# Patient Record
Sex: FEMALE | Race: WHITE | Employment: FULL TIME | URBAN - METROPOLITAN AREA
[De-identification: names, ages, dates, MRNs, and addresses within clinical notes are randomized per-mention and may not be internally consistent; named-entity substitution may affect disease eponyms.]

---

## 2021-08-16 ENCOUNTER — LAB (OUTPATIENT)
Dept: LAB | Facility: CLINIC | Age: 31
End: 2021-08-16
Payer: COMMERCIAL

## 2021-08-16 ENCOUNTER — OFFICE VISIT (OUTPATIENT)
Dept: ALLERGY | Facility: CLINIC | Age: 31
End: 2021-08-16
Payer: COMMERCIAL

## 2021-08-16 VITALS
SYSTOLIC BLOOD PRESSURE: 124 MMHG | HEART RATE: 78 BPM | WEIGHT: 249.6 LBS | OXYGEN SATURATION: 99 % | DIASTOLIC BLOOD PRESSURE: 87 MMHG

## 2021-08-16 DIAGNOSIS — H10.10 ALLERGIC RHINOCONJUNCTIVITIS: ICD-10-CM

## 2021-08-16 DIAGNOSIS — H10.10 ALLERGIC RHINOCONJUNCTIVITIS: Primary | ICD-10-CM

## 2021-08-16 DIAGNOSIS — Z51.6 NEED FOR DESENSITIZATION TO ALLERGENS: ICD-10-CM

## 2021-08-16 DIAGNOSIS — J30.9 ALLERGIC RHINOCONJUNCTIVITIS: Primary | ICD-10-CM

## 2021-08-16 DIAGNOSIS — J30.9 ALLERGIC RHINOCONJUNCTIVITIS: ICD-10-CM

## 2021-08-16 PROCEDURE — 99203 OFFICE O/P NEW LOW 30 MIN: CPT | Performed by: ALLERGY & IMMUNOLOGY

## 2021-08-16 PROCEDURE — 86003 ALLG SPEC IGE CRUDE XTRC EA: CPT

## 2021-08-16 PROCEDURE — 36415 COLL VENOUS BLD VENIPUNCTURE: CPT

## 2021-08-16 RX ORDER — OLOPATADINE HYDROCHLORIDE 2 MG/ML
1 SOLUTION/ DROPS OPHTHALMIC DAILY
COMMUNITY

## 2021-08-16 RX ORDER — EPINEPHRINE 0.3 MG/.3ML
0.3 INJECTION SUBCUTANEOUS
Qty: 2 EACH | Refills: 2 | Status: SHIPPED | OUTPATIENT
Start: 2021-08-16

## 2021-08-16 RX ORDER — FLUTICASONE PROPIONATE 50 MCG
1 SPRAY, SUSPENSION (ML) NASAL DAILY
COMMUNITY

## 2021-08-16 RX ORDER — CETIRIZINE HYDROCHLORIDE 10 MG/1
10 TABLET ORAL DAILY
COMMUNITY

## 2021-08-16 RX ORDER — FOLIC ACID/MULTIVIT,IRON,MINER 0.4MG-18MG
200 TABLET ORAL DAILY
COMMUNITY

## 2021-08-16 NOTE — PROGRESS NOTES
Pebbles Loomis was seen in the Allergy Clinic at [facility].    Pebbles Loomis is a 31 year old White female being seen today in consultation for allergies. She is interested in discussing allergy shots. She reports having itchy and watery eyes, itchy ears, sneezing, rhinorrhea, and cough in the spring through the fall. She takes fexofenadine, fluticasone nasal spray, and olopatadine eye drops to manage her symptoms. These symptoms have been present for many years - since about age 17. Pebbles feels her symptoms have been getting worse in the past 4 years. She does not have of frequent sinus infections. She has been prescribed albuterol to use during respiratory illnesses. She has been prescribed montelukast in the past but stopped it about 1 year ago. It helped with a cough she had at the time. She does not cough unless she stops taking her regular allergy medications. In addition to her seasonal symptoms Pebbles does react to cats.      PAST MEDICAL HISTORY:  Depression  Sleep Apnea - wears CPAP at night    FAMILY HISTORY:  Mother - asthma  Father - asthma    History reviewed. No pertinent surgical history.    ENVIRONMENTAL HISTORY:   [place] The home is heated with a forced air. They do have central air conditioning. The patient's bedroom is furnished with stuffed animals in bed, carpeting in bedroom, allergen mattress cover, allergen pillowcase cover and fabric window coverings.  Pets inside the house include 1 dog(s). There is no history of cockroach or mice infestation. Do you smoke cigarettes or other recreational drugs? No Do you vape or use an e-cigarette? No. There is/are 0 smokers living in the house. There is/are 1 who smoke ecigarettes/vape living in the house. The house does not have a damp basement.     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 She lives with her spouse.  Her spouse works in sales for [employer].    REVIEW OF  SYSTEMS:  General: negative for weight gain. negative for weight loss. negative for changes in sleep.   Eyes: negative for itching. negative for redness. positive  for tearing/watering. negative for vision changes  Ears: negative for fullness. negative for hearing loss. negative for dizziness.   Nose: negative for snoring.negative for changes in smell. negative for drainage.   Throat: negative for hoarseness. negative for sore throat. negative for trouble swallowing.   Lungs: negative for cough. negative for shortness of breath.negative for wheezing. negative for sputum production.   Cardiovascular: negative for chest pain. negative for swelling of ankles. negative for fast or irregular heartbeat.   Gastrointestinal: negative for nausea. negative for heartburn. negative for acid reflux.   Musculoskeletal: negative for joint pain. negative for joint stiffness. negative for joint swelling.   Neurologic: negative for seizures. negative for fainting. negative for weakness.   Psychiatric: negative for changes in mood. negative for anxiety.   Endocrine: negative for cold intolerance. negative for heat intolerance. negative for tremors.   Hematologic: negative for easy bruising. negative for easy bleeding.  Integumentary: negative for rash. negative for scaling. negative for nail changes.       Current Outpatient Medications:      cetirizine (ZYRTEC) 10 MG tablet, Take 10 mg by mouth daily, Disp: , Rfl:      Docosahexaenoic Acid (PRENATAL DHA) 200 MG capsule, Take 200 mg by mouth daily, Disp: , Rfl:      fluticasone (FLONASE) 50 MCG/ACT nasal spray, Spray 1 spray into both nostrils daily, Disp: , Rfl:      olopatadine (PATADAY) 0.2 % ophthalmic solution, 1 drop daily, Disp: , Rfl:      NABUMETONE 500 MG PO TABS, 1 TABLET TWICE DAILY (Patient not taking: Reported on 8/16/2021), Disp: 60 Tab, Rfl: 0     ORDER FOR DME, left elastic knee brace (Patient not taking: Reported on 8/16/2021), Disp: 1 Device, Rfl: 0     RECLIPSEN  0.15-30 MG-MCG PO TABS, 1 TABLET DAILY (Patient not taking: No sig reported), Disp: , Rfl:      UNKNOWN MED DOSAGE, med for anxiety prn (Patient not taking: Reported on 8/16/2021), Disp: , Rfl:   Immunization History   Administered Date(s) Administered     COVID-19,PF,Pfizer 04/26/2021, 05/16/2021     No Known Allergies      EXAM:   /87 (BP Location: Right arm, Patient Position: Sitting, Cuff Size: Adult Large)   Pulse 78   Wt 113.2 kg (249 lb 9.6 oz)   SpO2 99%   GENERAL APPEARANCE: alert, cooperative and not in distress  SKIN: no rashes, no lesions  HEAD: atraumatic, normocephalic  EYES: lids and lashes normal, conjunctivae and sclerae clear, EOM full and intact  ENT: no scars or lesions, nasal exam showed no discharge, swelling or lesions noted, otoscopy showed external auditory canals clear, tympanic membranes normal, tongue midline and normal, soft palate, uvula, and tonsils normal  NECK: no asymmetry, masses, or scars, supple without significant adenopathy  LUNGS: unlabored respirations, no intercostal retractions or accessory muscle use, clear to auscultation without rales or wheezes  HEART: regular rate and rhythm without murmurs and normal S1 and S2  MUSCULOSKELETAL: no musculoskeletal defects are noted  NEURO: no focal deficits noted  PSYCH: does not appear depressed or anxious    WORKUP: None    ASSESSMENT/PLAN:  Pebbles Loomis is a 31 year old female here for evaluation of allergies.    1. Allergic rhinoconjunctivitis - Pebbles reports a long-standing history of seasonal rhinoconjunctivitis symptoms. She continues to have symptoms despite taking antihistamines and using nasal steroids daily. She is interested in allergen immunotherapy and was counseled today regarding the risks, benefits, and recommended duration of treatment. Skin testing was deferred due to current antihistamine use.    - plan to initiate allergen immunotherapy treatment with cluster protocol pending lab results -  consent form signed in clinic today  - epinephrine auto-injector required per protocol  - continue fexofenadine - 180mg daily  - continue fluticasone nasal spray - 2 sprays in each nostril daily  - continue olopatadine eye drops - daily as needed  - Allergen cat epithellium IgE; Future  - Allergen dog epithelium IgE; Future  - Allergen Avila grass IgE; Future  - Allergen bird IgE; Future  - Allergen D farinae IgE; Future  - Allergen D pteronyssinus IgE; Future  - Allergen alternaria alternata IgE; Future  - Allergen aspergillus fumigatus IgE; Future  - Allergen cladosporium herbarum IgE; Future  - Allergen Epicoccum purpurascens IgE; Future  - Allergen penicillium notatum IgE; Future  - Allergen jaime white IgE; Future  - Allergen Cedar IgE; Future  - Allergen cottonwood IgE; Future  - Allergen elm IgE; Future  - Allergen maple box elder IgE; Future  - Allergen oak white IgE; Future  - Allergen Red [place] IgE; Future  - Allergen silver  birch IgE; Future  - Allergen Tree White [place] IgE; Future  - Allergen [place] Tree; Future  - Allergen white pine IgE; Future  - Allergen English plantain IgE; Future  - Allergen giant ragweed IgE; Future  - Allergen lamb's quarter IgE; Future  - Allergen Mugwort IgE; Future  - Allergen ragweed short IgE; Future  - Allergen Sagebrush Wormwood IgE; Future  - Allergen Sheep Sorrel IgE; Future  - Allergen thistle Russian IgE; Future  - Allergen Weed Nettle IgE; Future  - Allergen, Kochia/Firebush; Future    2. Need for desensitization to allergens    - EPINEPHrine (AUVI-Q) 0.3 MG/0.3ML injection 2-pack; Inject 0.3 mLs (0.3 mg) into the muscle once as needed for anaphylaxis  Dispense: 2 each; Refill: 2      Thank you for allowing me to participate in the care of Pebbles JOSE Schofieldervin Loomis.      Hemalatha Jimenes MD, FAAAAI  Allergy/Immunology  [facility]      Chart documentation done in part with Dragon Voice  Recognition Software. Although reviewed after completion, some word and grammatical errors may remain.

## 2021-08-16 NOTE — PATIENT INSTRUCTIONS
If you have any questions regarding your allergies, asthma, or what we discussed during your visit today please call the allergy clinic or contact us via [employer].    [facility] Allergy RN Line: [phone] - call this number with any questions during or after business/clinic hours  [employer] [facility] Scheduling Line: [phone]  [facility] Scheduling Line: [phone] - this number is ONLY for scheduling and should not be used to get in touch with the allergy team    Clinic Schedule:   [facility] - Monday, Tuesday, and Thursday  [address] [place] [address]    [facility] - Wednesday  [address] [place] [address]        Patient Education   Allergy Shots (Immunotherapy)  What You Need to Know  What are allergy shots?  Allergy shots are used to treat allergic reactions. They are given in the upper arm.  These shots will slowly build your tolerance to the things you are allergic to (such as pollen, mold and animal dander). They reduce the body's allergic response.  What are the benefits of getting allergy shots?  Allergy shots may:    Relieve symptoms long after treatment has ended    Allow you to take less allergy medicine, or stop taking it altogether    Prevent new allergies in the future    Keep children's allergies from getting worse and turning into asthma    Improve eczema caused by allergies.  The average patient sees a large improvement in his or her symptoms (up to 80%).  Who should have allergy shots?  Allergy shots are helpful when allergies cause:    Asthma    Itchy, watery eyes (allergic conjunctivitis)    Runny nose, sneezing, sore throat and other symptoms (allergic rhinitis)    Severe reaction to insect stings.  For children, it is best to wait until age 5 before starting allergy shots.  How will I feel during and after treatment?  You will have shots every 3 to 14 days for 4 to 8 months. We will increase  the dose each time until we reach a level that works for you. After that, you will have the shots less often.     It may take another year for symptoms to improve. Many people improve much sooner.    You will likely have shots for another 3 to 5 years.  Allergy shots can reduce your symptoms a little or lot. Some people find that their allergies go away entirely.   Most people have long-lasting relief after treatment ends. You should see your doctor every year to find out if you need more shots.  What are the risks?  With each allergy shot, there is the risk of allergic reaction. Some reactions are mild. Others can be life threatening and must be treated at once.   Common reactions  It is common to have a mild reaction, such as redness, swelling, pain and itching in the area of the shot. This can occur right away or up to several hours after the shot. Sometimes the reaction moves into the upper arm. Call your doctor if:    The reaction area is more than 2 inches wide.    You still have the reaction the day after the shot.  Severe reactions  The reactions below are rare, but serious. If not treated, they can lead to very low blood pressure and even death. If you have any of these, get help at once.     Hives include a rash, swelling and itching on more than one part of the body. They may occur within minutes to hours after a shot.    Swelling of any part of the body. For example, you may have swelling of the ears, tongue, lips, throat, intestines, hands or feet. Swelling may affect more than one body part. These reactions could occur within minutes to hours after the shot.    Trouble breathing. You may develop sneezing, runny nose, stuffy nose, cough or asthma symptoms. These reactions can occur within minutes to hours after the shot.    Anaphylactic shock is sudden, severe and may include symptoms such as hives, trouble breathing, stomach pain, feeling faint or dizzy and low blood pressure. It may cause a loss of  consciousness and could lead to death. This reaction usually occurs within minutes of the shot but can happen a few hours later. It is very rare.  You might have a severe reaction after the first shot, or it may occur after a series of shots. If you have a reaction, we will treat you right away. In the future, we will change the dose of your allergy shots.  What happens after each shot?  You should wait in the doctor's office for 30 minutes after each shot. If you have a reaction, we may ask you to stay longer. If you cannot wait the 30 minutes, you should not have your allergy shot.  If you have a severe reaction after leaving the doctor's office, use your epinephrine auto-injector (Epi-pen) and go to the nearest emergency room. If treated promptly, severe reactions are rarely life threatening. We will never give an allergy shot unless medical help is nearby in case of emergency.   What else do I need to know?  If you start taking any new medicines  It is not safe to have these shots while taking certain medicines. If any doctor prescribes new medicine for you, please let us know. This includes:    Beta blockers, often used for heart disease    Blood pressure medicine    Medicine for migraine headaches    Glaucoma medicine.  A note for women  If you get pregnant, tell the office staff at once.   Your doctor will decide how often you should receive shots during pregnancy. We will not increase your dose while you are pregnant.  If you will have shots at another clinic  If you will have your allergy shots at another clinic, you must provide the name and address of the doctor who will give the shots. We will ask you to fill out a form.  If you have any questions or concerns, please speak to your doctor or a member of our staff.   For informational purposes only. Not to replace the advice of your health care provider.   Copyright   2009 [employer]. All rights reserved. [employer] [id number] - REV 07/17.        ACCELERATED IMMUNOTHERAPY PATIENT INFORMATION    Immunotherapy is a treatment that alters the patient's immune system so they have less allergy symptoms, use less medications to control symptoms, have improved quality of life, and less health care utilization    Accelerated immunotherapy schedules are designed to allow patients to reach their maintenance immunotherapy dose in a shorter time frame than conventional immunotherapy.    Clinical benefit can be reached more rapidly using accelerated immunotherapy.     Many patients do not want to start allergen immunotherapy secondary to the upfront time commitment associated with conventional allergy shots. Cluster immunotherapy would allow the patient to be on monthly injections in a much shorter period of time. The maintenance dose is typically reached within 8 to 9 weeks.     Cluster immunotherapy has a similar risk of systemic reaction to conventional immunotherapy. The patient will need to take oral antihistamines to pre-medicate prior to injection appointments while going through this treatment.    CLUSTER IMMUNOTHERAPY PATIENT INSTRUCTIONS    Asthma medications must be continued and asthma must be well controlled prior to receiving CLUSTER immunotherapy. Immunotherapy should not be given if you are feeling ill.    Other allergy medications may be continued    You should plan to spend approximately 2 hours at the clinic. Please feel free to bring things to occupy your time such as books, work, or computers. You may also wish to bring something to eat as you will not be able to leave the clinic once the procedure has begun.    You will be required to bring an epinephrine auto-injector with you to your CLUSTER immunotherapy appointments and all subsequent allergy shot appointments    You will be required to take the following pre-medication regimen prior  to your CLUSTER immunotherapy appointments  o Medications to be taken the day prior to CLUSTER  appointment:  - Zyrtec (cetirizine) 20mg twice daily, Xyzal (levocetirizine) 10mg twice daily, or Allegra (fexofenadine) 360mg twice daily  o Medications to be taken the morning of CLUSTER appointment or at least 1 hour prior to the visit:  - Zyrtec (cetirizine) 20mg, Xyzal (levocetirizine) 10mg, or Allegra (fexofenadine) 360mg      Anaphylaxis Action Plan for Immunotherapy Patients    There is risk of systemic reactions when receiving immunotherapy injections. Local reactions such as a wheal (hive) smaller than a quarter, redness, swelling, and soreness are common. If the wheal is greater than the size of a half dollar (3.4 cm) please contact our clinic (numbers below), as we will need to adjust the dose that you receive at your next injection. Waiting until the next appointment to inform us of the reaction could increase the wait time that you experience, because your allergist will need to be contacted for new orders prior to giving the injection.  If you have symptoms not localized to the injection site, please follow the anaphylaxis plan, and contact our office to update after you have received emergency medical treatment. Please ask to speak to an Allergy RN with any questions or updates.     [facility]: [phone]  [facility]: [phone]  [phone]    \

## 2021-08-16 NOTE — LETTER
8/16/2021         RE: Pebbles Lowry  [address] [place] [address]        Dear Colleague,    Thank you for referring your patient, Pebbles Lowry, to the [facility]. Please see a copy of my visit note below.    Pebbles Loomis was seen in the Allergy Clinic at [facility].    Pebbles Loomis is a 31 year old White female being seen today in consultation for allergies. She is interested in discussing allergy shots. She reports having itchy and watery eyes, itchy ears, sneezing, rhinorrhea, and cough in the spring through the fall. She takes fexofenadine, fluticasone nasal spray, and olopatadine eye drops to manage her symptoms. These symptoms have been present for many years - since about age 17. Pebbles feels her symptoms have been getting worse in the past 4 years. She does not have of frequent sinus infections. She has been prescribed albuterol to use during respiratory illnesses. She has been prescribed montelukast in the past but stopped it about 1 year ago. It helped with a cough she had at the time. She does not cough unless she stops taking her regular allergy medications. In addition to her seasonal symptoms Pebbles does react to cats.      PAST MEDICAL HISTORY:  Depression  Sleep Apnea - wears CPAP at night    FAMILY HISTORY:  Mother - asthma  Father - asthma    History reviewed. No pertinent surgical history.    ENVIRONMENTAL HISTORY:   Pebbles lives in a [facility] [place] The home is heated with a forced air. They do have central air conditioning. The patient's bedroom is furnished with stuffed animals in bed, carpeting in bedroom, allergen mattress cover, allergen pillowcase cover and fabric window coverings.  Pets inside the house include 1 dog(s). There is no history of cockroach or mice infestation. Do you smoke cigarettes or other recreational drugs? No Do you vape or use an e-cigarette? No. There is/are 0 smokers  living in the house. There is/are 1 who smoke ecigarettes/vape living in the house. The house does not have a damp basement.     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 She lives with her spouse.  Her spouse works in sales for [employer].    REVIEW OF SYSTEMS:  General: negative for weight gain. negative for weight loss. negative for changes in sleep.   Eyes: negative for itching. negative for redness. positive  for tearing/watering. negative for vision changes  Ears: negative for fullness. negative for hearing loss. negative for dizziness.   Nose: negative for snoring.negative for changes in smell. negative for drainage.   Throat: negative for hoarseness. negative for sore throat. negative for trouble swallowing.   Lungs: negative for cough. negative for shortness of breath.negative for wheezing. negative for sputum production.   Cardiovascular: negative for chest pain. negative for swelling of ankles. negative for fast or irregular heartbeat.   Gastrointestinal: negative for nausea. negative for heartburn. negative for acid reflux.   Musculoskeletal: negative for joint pain. negative for joint stiffness. negative for joint swelling.   Neurologic: negative for seizures. negative for fainting. negative for weakness.   Psychiatric: negative for changes in mood. negative for anxiety.   Endocrine: negative for cold intolerance. negative for heat intolerance. negative for tremors.   Hematologic: negative for easy bruising. negative for easy bleeding.  Integumentary: negative for rash. negative for scaling. negative for nail changes.       Current Outpatient Medications:      cetirizine (ZYRTEC) 10 MG tablet, Take 10 mg by mouth daily, Disp: , Rfl:      Docosahexaenoic Acid (PRENATAL DHA) 200 MG capsule, Take 200 mg by mouth daily, Disp: , Rfl:      fluticasone (FLONASE) 50 MCG/ACT nasal spray, Spray 1 spray into both nostrils daily, Disp: , Rfl:      olopatadine (PATADAY) 0.2 % ophthalmic solution, 1 drop daily, Disp: ,  Rfl:      NABUMETONE 500 MG PO TABS, 1 TABLET TWICE DAILY (Patient not taking: Reported on 8/16/2021), Disp: 60 Tab, Rfl: 0     ORDER FOR DME, left elastic knee brace (Patient not taking: Reported on 8/16/2021), Disp: 1 Device, Rfl: 0     RECLIPSEN 0.15-30 MG-MCG PO TABS, 1 TABLET DAILY (Patient not taking: No sig reported), Disp: , Rfl:      UNKNOWN MED DOSAGE, med for anxiety prn (Patient not taking: Reported on 8/16/2021), Disp: , Rfl:   Immunization History   Administered Date(s) Administered     COVID-19,PF,Pfizer 04/26/2021, 05/16/2021     No Known Allergies      EXAM:   /87 (BP Location: Right arm, Patient Position: Sitting, Cuff Size: Adult Large)   Pulse 78   Wt 113.2 kg (249 lb 9.6 oz)   SpO2 99%   GENERAL APPEARANCE: alert, cooperative and not in distress  SKIN: no rashes, no lesions  HEAD: atraumatic, normocephalic  EYES: lids and lashes normal, conjunctivae and sclerae clear, EOM full and intact  ENT: no scars or lesions, nasal exam showed no discharge, swelling or lesions noted, otoscopy showed external auditory canals clear, tympanic membranes normal, tongue midline and normal, soft palate, uvula, and tonsils normal  NECK: no asymmetry, masses, or scars, supple without significant adenopathy  LUNGS: unlabored respirations, no intercostal retractions or accessory muscle use, clear to auscultation without rales or wheezes  HEART: regular rate and rhythm without murmurs and normal S1 and S2  MUSCULOSKELETAL: no musculoskeletal defects are noted  NEURO: no focal deficits noted  PSYCH: does not appear depressed or anxious    WORKUP: None    ASSESSMENT/PLAN:  Pebbles Loomis is a 31 year old female here for evaluation of allergies.    1. Allergic rhinoconjunctivitis - Pebbles reports a long-standing history of seasonal rhinoconjunctivitis symptoms. She continues to have symptoms despite taking antihistamines and using nasal steroids daily. She is interested in allergen immunotherapy and was  counseled today regarding the risks, benefits, and recommended duration of treatment. Skin testing was deferred due to current antihistamine use.    - plan to initiate allergen immunotherapy treatment with cluster protocol pending lab results - consent form signed in clinic today  - epinephrine auto-injector required per protocol  - continue fexofenadine - 180mg daily  - continue fluticasone nasal spray - 2 sprays in each nostril daily  - continue olopatadine eye drops - daily as needed  - Allergen cat epithellium IgE; Future  - Allergen dog epithelium IgE; Future  - Allergen Avila grass IgE; Future  - Allergen bird IgE; Future  - Allergen D farinae IgE; Future  - Allergen D pteronyssinus IgE; Future  - Allergen alternaria alternata IgE; Future  - Allergen aspergillus fumigatus IgE; Future  - Allergen cladosporium herbarum IgE; Future  - Allergen Epicoccum purpurascens IgE; Future  - Allergen penicillium notatum IgE; Future  - Allergen jaime white IgE; Future  - Allergen Cedar IgE; Future  - Allergen cottonwood IgE; Future  - Allergen elm IgE; Future  - Allergen maple box elder IgE; Future  - Allergen oak white IgE; Future  - Allergen Red [place] IgE; Future  - Allergen silver  birch IgE; Future  - Allergen Tree White [place] IgE; Future  - Allergen Avon Tree; Future  - Allergen white pine IgE; Future  - Allergen English plantain IgE; Future  - Allergen giant ragweed IgE; Future  - Allergen lamb's quarter IgE; Future  - Allergen Mugwort IgE; Future  - Allergen ragweed short IgE; Future  - Allergen Sagebrush [place] IgE; Future  - Allergen Sheep Sorrel IgE; Future  - Allergen thistle Russian IgE; Future  - Allergen Weed Nettle IgE; Future  - Allergen, Kochia/Firebush; Future    2. Need for desensitization to allergens    - EPINEPHrine (AUVI-Q) 0.3 MG/0.3ML injection 2-pack; Inject 0.3 mLs (0.3 mg) into the muscle once as needed for anaphylaxis  Dispense: 2 each; Refill: 2      Thank you for allowing me to  participate in the care of Pebbles Loomis.      Hemalatha Jimenes MD, FAAAAI  Allergy/Immunology  [facility]      Chart documentation done in part with Dragon Voice Recognition Software. Although reviewed after completion, some word and grammatical errors may remain.    Writer demonstrated how to use an EpiPen auto-injector.  Patient instructed to form a fist around the auto-injector, remove blue safety release by pulling straight up, then firmly push orange tip against outer thigh so it clicks, holding for 3 seconds.  Patient advised that once used, needle will not be exposed, as orange tip extends.  Patient advised to call 911 or go to emergency department after epi-pen use for further monitoring.       Writer demonstrated how to use an Auvi-Q Epinephrine auto-injector.  Patient instructed to remove cap from device and follow the instructions given by the recorded audio. This includes removing the red safety release by pulling straight out, then firmly pushing the black tip against outer thigh until it clicks, hold for 2 seconds.  Patient advised that once used, needle will not be exposed, as it retracts back into the device.  Patient advised to call 911 or go to emergency department after Auvi-Q use for further monitoring.       RN reviewed Anaphylaxis Action Plan with patient. Educated on the symptoms and treatment of anaphylaxis. Went through the different ways that a reaction can present, and the body systems that it can affect. Patient verbalized understanding.     Mary Beth Ortega, RN      Again, thank you for allowing me to participate in the care of your patient.        Sincerely,        Hemalatha Jimenes MD

## 2021-08-16 NOTE — PROGRESS NOTES
Writer demonstrated how to use an EpiPen auto-injector.  Patient instructed to form a fist around the auto-injector, remove blue safety release by pulling straight up, then firmly push orange tip against outer thigh so it clicks, holding for 3 seconds.  Patient advised that once used, needle will not be exposed, as orange tip extends.  Patient advised to call 911 or go to emergency department after epi-pen use for further monitoring.       Writer demonstrated how to use an Auvi-Q Epinephrine auto-injector.  Patient instructed to remove cap from device and follow the instructions given by the recorded audio. This includes removing the red safety release by pulling straight out, then firmly pushing the black tip against outer thigh until it clicks, hold for 2 seconds.  Patient advised that once used, needle will not be exposed, as it retracts back into the device.  Patient advised to call 911 or go to emergency department after Auvi-Q use for further monitoring.       RN reviewed Anaphylaxis Action Plan with patient. Educated on the symptoms and treatment of anaphylaxis. Went through the different ways that a reaction can present, and the body systems that it can affect. Patient verbalized understanding.     Mary Beth Ortega RN

## 2021-08-17 LAB
A ALTERNATA IGE QN: 1.63 KU(A)/L
A FUMIGATUS IGE QN: <0.1 KU(A)/L
C HERBARUM IGE QN: <0.1 KU(A)/L
CALIF WALNUT POLN IGE QN: <0.1 KU(A)/L
CAT DANDER IGG QN: <0.1 KU(A)/L
CEDAR IGE QN: <0.1 KU(A)/L
COMMON RAGWEED IGE QN: 0.24 KU(A)/L
COTTONWOOD IGE QN: <0.1 KU(A)/L
D FARINAE IGE QN: <0.1 KU(A)/L
D PTERONYSS IGE QN: <0.1 KU(A)/L
DOG DANDER+EPITH IGE QN: <0.1 KU(A)/L
E PURPURASCENS IGE QN: <0.1 KU(A)/L
EAST WHITE PINE IGE QN: <0.1 KU(A)/L
ENGL PLANTAIN IGE QN: <0.1 KU(A)/L
FIREBUSH IGE QN: <0.1 KU(A)/L
GIANT RAGWEED IGE QN: <0.1 KU(A)/L
GOOSEFOOT IGE QN: <0.1 KU(A)/L
JOHNSON GRASS IGE QN: <0.1 KU(A)/L
MAPLE IGE QN: <0.1 KU(A)/L
MUGWORT IGE QN: <0.1 KU(A)/L
NETTLE IGE QN: <0.1 KU(A)/L
P NOTATUM IGE QN: <0.1 KU(A)/L
RED MULBERRY IGE QN: <0.1 KU(A)/L
SALTWORT IGE QN: <0.1 KU(A)/L
SHEEP SORREL IGE QN: <0.1 KU(A)/L
SILVER BIRCH IGE QN: <0.1 KU(A)/L
TIMOTHY IGE QN: <0.1 KU(A)/L
WHITE ASH IGE QN: <0.1 KU(A)/L
WHITE ELM IGE QN: <0.1 KU(A)/L
WHITE MULBERRY IGE QN: <0.1 KU(A)/L
WHITE OAK IGE QN: <0.1 KU(A)/L
WORMWOOD IGE QN: <0.1 KU(A)/L

## 2021-08-20 ENCOUNTER — MYC MEDICAL ADVICE (OUTPATIENT)
Dept: ALLERGY | Facility: CLINIC | Age: 31
End: 2021-08-20

## 2021-09-13 ENCOUNTER — OFFICE VISIT (OUTPATIENT)
Dept: ALLERGY | Facility: CLINIC | Age: 31
End: 2021-09-13
Payer: COMMERCIAL

## 2021-09-13 VITALS
OXYGEN SATURATION: 98 % | WEIGHT: 141.8 LBS | DIASTOLIC BLOOD PRESSURE: 91 MMHG | SYSTOLIC BLOOD PRESSURE: 150 MMHG | HEART RATE: 92 BPM

## 2021-09-13 DIAGNOSIS — J30.1 SEASONAL ALLERGIC RHINITIS DUE TO POLLEN: Primary | ICD-10-CM

## 2021-09-13 DIAGNOSIS — J30.89 ALLERGIC RHINITIS DUE TO MOLD: ICD-10-CM

## 2021-09-13 DIAGNOSIS — J30.89 ALLERGIC RHINITIS DUE TO MOLD: Primary | ICD-10-CM

## 2021-09-13 DIAGNOSIS — J30.1 SEASONAL ALLERGIC RHINITIS DUE TO POLLEN: ICD-10-CM

## 2021-09-13 PROCEDURE — 99207 PR DROP WITH A PROCEDURE: CPT | Performed by: ALLERGY & IMMUNOLOGY

## 2021-09-13 PROCEDURE — 95004 PERQ TESTS W/ALRGNC XTRCS: CPT | Performed by: ALLERGY & IMMUNOLOGY

## 2021-09-13 NOTE — PROGRESS NOTES
Pebbles Lowry was seen in the Allergy Clinic at [facility].      Pebbles Lowry is a 31 year old American female who is seen today for allergy testing.    History reviewed. No pertinent past medical history.  History reviewed. No pertinent family history.  Social History     Tobacco Use     Smoking status: Never Smoker     Smokeless tobacco: Never Used   Substance Use Topics     Alcohol use: None     Drug use: None     Social History     Social History Narrative     Not on file       Past medical, family, and social history were reviewed.    REVIEW OF SYSTEMS:  General: negative for weight gain. negative for weight loss. negative for changes in sleep.   Eyes: negative for itching. negative for redness. negative for tearing/watering. negative for vision changes  Ears: negative for fullness. negative for hearing loss. negative for dizziness.   Nose: negative for snoring.negative for changes in smell. negative for drainage.   Throat: negative for hoarseness. negative for sore throat. negative for trouble swallowing.   Lungs: positive  for cough. negative for shortness of breath.negative for wheezing. negative for sputum production.   Cardiovascular: negative for chest pain. negative for swelling of ankles. negative for fast or irregular heartbeat.   Gastrointestinal: negative for nausea. negative for heartburn. negative for acid reflux.   Musculoskeletal: negative for joint pain. negative for joint stiffness. negative for joint swelling.   Neurologic: negative for seizures. negative for fainting. negative for weakness.   Psychiatric: negative for changes in mood. negative for anxiety.   Endocrine: negative for cold intolerance. negative for heat intolerance. negative for tremors.   Hematologic: negative for easy bruising. negative for easy bleeding.  Integumentary: negative for rash. negative for scaling. negative for nail changes.       Current Outpatient Medications:      Docosahexaenoic Acid  (PRENATAL DHA) 200 MG capsule, Take 200 mg by mouth daily, Disp: , Rfl:      EPINEPHrine (AUVI-Q) 0.3 MG/0.3ML injection 2-pack, Inject 0.3 mLs (0.3 mg) into the muscle once as needed for anaphylaxis, Disp: 2 each, Rfl: 2     fluticasone (FLONASE) 50 MCG/ACT nasal spray, Spray 1 spray into both nostrils daily, Disp: , Rfl:      NABUMETONE 500 MG PO TABS, 1 TABLET TWICE DAILY, Disp: 60 Tab, Rfl: 0     olopatadine (PATADAY) 0.2 % ophthalmic solution, 1 drop daily, Disp: , Rfl:      ORDER FOR DME, left elastic knee brace, Disp: 1 Device, Rfl: 0     RECLIPSEN 0.15-30 MG-MCG PO TABS, , Disp: , Rfl:      UNKNOWN MED DOSAGE, med for anxiety prn, Disp: , Rfl:      cetirizine (ZYRTEC) 10 MG tablet, Take 10 mg by mouth daily (Patient not taking: Reported on 9/13/2021), Disp: , Rfl:   No Known Allergies    EXAM:   BP (!) 150/91 (BP Location: Right arm, Patient Position: Sitting, Cuff Size: Adult Large)   Pulse 92   Wt 64.3 kg (141 lb 12.8 oz)   SpO2 98%   GENERAL APPEARANCE: alert, cooperative and not in distress  SKIN: no rashes, no lesions  HEAD: atraumatic, normocephalic  MUSCULOSKELETAL: no musculoskeletal defects are noted  NEURO: no focal deficits noted  PSYCH: does not appear depressed or anxious      WORKUP:  Skin testing    ENVIRONMENTAL PERCUTANEOUS SKIN TESTING: ADULT  [facility] 9/13/2021   Consent Y   Ordering Physician Dr. Jimenes   Interpreting Physician Dr. Jimenes   Testing Technician Mary Beth BALLARD RN   Location Back   Time start: 11:57 AM   Time End: 12:12 PM   Positive Control: Histatrol*ALK 1 mg/ml 7/33   Negative Control: 50% Glycerin 0   Cat Hair*ALK (10,000 BAU/ml) 0   AP Dog Hair/Dander (1:100 w/v) 0   Dust Mite p. 30,000 AU/ml 0   Dust Mite f. (30,000 AU/ml) 0   Avila (W/F in millimeters) 0   Arnulfo Grass (100,000 BAU/mL) 0   Red Cedar (W/F in millimeters) 0   Maple/Linden (W/F in millimeters) 0   Hackberry (W/F in millimeters) 0   [place]erg[place] (W/F in millimeters) 0   [place] *ALK (W/F in  millimeters) 0   American Elm (W/F in millimeters) 0   [place] (W/F in millimeters) 0   Black [place] (W/F in millimeters) 0   Birch Mix (W/F in millimeters) 0   [place] (W/F in millimeters) 0   Oak (W/F in millimeters) 0   Cocklebur (W/F in millimeters) 0   [place] (W/F in millimeters) 0   White [place] (W/F in millimeters) 0   Careless (W/F in millimeters) 0   Nettle (W/F in millimeters) 0   English Plantain (W/F in millimeters) 0   Kochia (W/F in millimeters) 0   Lamb's Quarter (W/F in millimeters) 0   Marshelder (W/F in millimeters) 0   Ragweed Mix* ALK (W/F in millimeters) 7/35   Russian Thistle (W/F in millimeters) 0   Sagebrush/Mugwort (W/F in millimeters) 0   Sheep Sorrel (W/F in millimeters) 0   Feather Mix* ALK (W/F in millimeters) 0   Penicillium Mix (1:10 w/v) 0   Curvularia spicifera (1:10 w/v) 0   Epicoccum (1:10 w/v) 6/20   Aspergillus fumigatus (1:10 w/v): 0   Alternaria tenius (1:10 w/v) 0   H. Cladosporium (1:10 w/v) 0   Phoma herbarum (1:10 w/v) 0      Appropriate response to controls, positive to ragweed and Epicoccum    ASSESSMENT/PLAN:  Pebbles Lowry is a 31 year old female here for allergy testing.    1. Seasonal allergic rhinitis due to pollen - Skin testing was notable for sensitization to ragweed and Epicoccum. Recent specific IgE testing was also notable for sensitization to Alternaria.    - plan to initiate allergen immunotherapy treatment - consent form signed in clinic  - ALLERGY SKIN TESTS,ALLERGENS    2. Allergic rhinitis due to mold    - ALLERGY SKIN TESTS,ALLERGENS      Thank you for allowing me to participate in the care of Pebbles Lowry.      Hemalatha Jimenes MD, FAAAAI  Allergy/Immunology  [facility]      Chart documentation done in part with Dragon Voice Recognition Software. Although reviewed after completion, some word and grammatical errors may remain.

## 2021-09-13 NOTE — LETTER
9/13/2021         RE: Pebbles Lowry  [address] [place] [address]        Dear Colleague,    Thank you for referring your patient, Pebbles Lowry, to the [facility]. Please see a copy of my visit note below.    Pebbles Lowry was seen in the Allergy Clinic at [facility].      Pebbles Lowyr is a 31 year old American female who is seen today for allergy testing.    History reviewed. No pertinent past medical history.  History reviewed. No pertinent family history.  Social History     Tobacco Use     Smoking status: Never Smoker     Smokeless tobacco: Never Used   Substance Use Topics     Alcohol use: None     Drug use: None     Social History     Social History Narrative     Not on file       Past medical, family, and social history were reviewed.    REVIEW OF SYSTEMS:  General: negative for weight gain. negative for weight loss. negative for changes in sleep.   Eyes: negative for itching. negative for redness. negative for tearing/watering. negative for vision changes  Ears: negative for fullness. negative for hearing loss. negative for dizziness.   Nose: negative for snoring.negative for changes in smell. negative for drainage.   Throat: negative for hoarseness. negative for sore throat. negative for trouble swallowing.   Lungs: positive  for cough. negative for shortness of breath.negative for wheezing. negative for sputum production.   Cardiovascular: negative for chest pain. negative for swelling of ankles. negative for fast or irregular heartbeat.   Gastrointestinal: negative for nausea. negative for heartburn. negative for acid reflux.   Musculoskeletal: negative for joint pain. negative for joint stiffness. negative for joint swelling.   Neurologic: negative for seizures. negative for fainting. negative for weakness.   Psychiatric: negative for changes in mood. negative for anxiety.   Endocrine: negative for cold intolerance. negative for heat  intolerance. negative for tremors.   Hematologic: negative for easy bruising. negative for easy bleeding.  Integumentary: negative for rash. negative for scaling. negative for nail changes.       Current Outpatient Medications:      Docosahexaenoic Acid (PRENATAL DHA) 200 MG capsule, Take 200 mg by mouth daily, Disp: , Rfl:      EPINEPHrine (AUVI-Q) 0.3 MG/0.3ML injection 2-pack, Inject 0.3 mLs (0.3 mg) into the muscle once as needed for anaphylaxis, Disp: 2 each, Rfl: 2     fluticasone (FLONASE) 50 MCG/ACT nasal spray, Spray 1 spray into both nostrils daily, Disp: , Rfl:      NABUMETONE 500 MG PO TABS, 1 TABLET TWICE DAILY, Disp: 60 Tab, Rfl: 0     olopatadine (PATADAY) 0.2 % ophthalmic solution, 1 drop daily, Disp: , Rfl:      ORDER FOR DME, left elastic knee brace, Disp: 1 Device, Rfl: 0     RECLIPSEN 0.15-30 MG-MCG PO TABS, , Disp: , Rfl:      UNKNOWN MED DOSAGE, med for anxiety prn, Disp: , Rfl:      cetirizine (ZYRTEC) 10 MG tablet, Take 10 mg by mouth daily (Patient not taking: Reported on 9/13/2021), Disp: , Rfl:   No Known Allergies    EXAM:   BP (!) 150/91 (BP Location: Right arm, Patient Position: Sitting, Cuff Size: Adult Large)   Pulse 92   Wt 64.3 kg (141 lb 12.8 oz)   SpO2 98%   GENERAL APPEARANCE: alert, cooperative and not in distress  SKIN: no rashes, no lesions  HEAD: atraumatic, normocephalic  MUSCULOSKELETAL: no musculoskeletal defects are noted  NEURO: no focal deficits noted  PSYCH: does not appear depressed or anxious      WORKUP:  Skin testing    ENVIRONMENTAL PERCUTANEOUS SKIN TESTING: ADULT  [facility] 9/13/2021   Consent Y   Ordering Physician Dr. Jimenes   Interpreting Physician Dr. Jimenes   Testing Technician Mary Beth BALLARD RN   Location Back   Time start: 11:57 AM   Time End: 12:12 PM   Positive Control: Histatrol*ALK 1 mg/ml 7/33   Negative Control: 50% Glycerin 0   Cat Hair*ALK (10,000 BAU/ml) 0   AP Dog Hair/Dander (1:100 w/v) 0   Dust Mite p. 30,000 AU/ml 0   Dust Mite f.  (30,000 AU/ml) 0   Avila (W/F in millimeters) 0   Arnulfo Grass (100,000 BAU/mL) 0   Red Cedar (W/F in millimeters) 0   Maple/[place] (W/F in millimeters) 0   Hackberry (W/F in millimeters) 0   [place] (W/F in millimeters) 0   [place] *ALK (W/F in millimeters) 0   American Elm (W/F in millimeters) 0   [place] (W/F in millimeters) 0   Black [place] (W/F in millimeters) 0   Birch Mix (W/F in millimeters) 0   [place] (W/F in millimeters) 0   Oak (W/F in millimeters) 0   Cocklebur (W/F in millimeters) 0   [place] (W/F in millimeters) 0   White [place] (W/F in millimeters) 0   Careless (W/F in millimeters) 0   Nettle (W/F in millimeters) 0   English Plantain (W/F in millimeters) 0   Kochia (W/F in millimeters) 0   Lamb's Quarter (W/F in millimeters) 0   Marshelder (W/F in millimeters) 0   Ragweed Mix* ALK (W/F in millimeters) 7/35   Russian Thistle (W/F in millimeters) 0   Sagebrush/Mugwort (W/F in millimeters) 0   Sheep Sorrel (W/F in millimeters) 0   Feather Mix* ALK (W/F in millimeters) 0   Penicillium Mix (1:10 w/v) 0   Curvularia spicifera (1:10 w/v) 0   Epicoccum (1:10 w/v) 6/20   Aspergillus fumigatus (1:10 w/v): 0   Alternaria tenius (1:10 w/v) 0   H. Cladosporium (1:10 w/v) 0   Phoma herbarum (1:10 w/v) 0      Appropriate response to controls, positive to ragweed and Epicoccum    ASSESSMENT/PLAN:  Pebbles Lowry is a 31 year old female here for allergy testing.    1. Seasonal allergic rhinitis due to pollen - Skin testing was notable for sensitization to ragweed and Epicoccum. Recent specific IgE testing was also notable for sensitization to Alternaria.    - plan to initiate allergen immunotherapy treatment - consent form signed in clinic  - ALLERGY SKIN TESTS,ALLERGENS    2. Allergic rhinitis due to mold    - ALLERGY SKIN TESTS,ALLERGENS      Thank you for allowing me to participate in the care of Pebbles Lowry.      Hemalatha Jimenes MD, FAAAAI  Allergy/Immunology  [facility]      Chart documentation done in part with Dragon Voice Recognition Software. Although reviewed after completion, some word and grammatical errors may remain.    Per provider verbal order, placed Adult Environmental Panel scratch test.  Consent was obtained prior to procedure.  Once panels were placed, patient was monitored for 15 minutes in clinic.  RN read test after 15 minutes and provider was notified of results.  Pt tolerated procedure well.  All questions and concerns were addressed at office visit.     SIRIA Rose, RN        Again, thank you for allowing me to participate in the care of your patient.        Sincerely,        Hemalatha Jimenes MD

## 2021-09-13 NOTE — PROGRESS NOTES
Per provider verbal order, placed Adult Environmental Panel scratch test.  Consent was obtained prior to procedure.  Once panels were placed, patient was monitored for 15 minutes in clinic.  RN read test after 15 minutes and provider was notified of results.  Pt tolerated procedure well.  All questions and concerns were addressed at office visit.     PEDRITO RoseN, RN

## 2021-09-13 NOTE — PROGRESS NOTES
ALLERGY SOLUTION NEW REQUEST    Pebbles Lowry 1990 MRN: [id number]    DATE NEEDED:  2-3 weeks  Vial Color   Content   Top Dose         Vial Size  Green 1:1,000, Blue 1:100, Yellow 1:10 and Red 1:1  Molds    Red 1:1 0.5   5mL  Green 1:1,000, Blue 1:100, Yellow 1:10 and Red 1:1  Weeds    Red 1:1 0.5   5mL        [facility] Clinic Location:  [facility]  Ship to Location: [facility]  Billing Location: [facility]  Special Instructions:  None        Requester Signature  Hemalatha Jimenes MD

## 2021-09-23 DIAGNOSIS — J30.89 ALLERGIC RHINITIS DUE TO MOLD: Primary | ICD-10-CM

## 2021-09-23 PROCEDURE — 95165 ANTIGEN THERAPY SERVICES: CPT | Performed by: ALLERGY & IMMUNOLOGY

## 2021-09-23 NOTE — PROGRESS NOTES
Allergy serums billed to [facility].     Vials billed below:    Vial Color Content                      Vial Size Expiration Date  Green 1:1,000,  Molds                                    5mL  12/23/2021  Blue 1:100,  Molds                                    5mL   3/23/2022  Yellow 1:10    Molds                                    5mL  9/23/2022  Red 1:1                         Molds                                    5mL 9/23/2022    Checked by Zac MEJIA  30 units billed  Signature  Lizeth Gutierrez RN

## 2021-09-24 DIAGNOSIS — J30.1 SEASONAL ALLERGIC RHINITIS DUE TO POLLEN: Primary | ICD-10-CM

## 2021-09-24 PROCEDURE — 95165 ANTIGEN THERAPY SERVICES: CPT | Performed by: ALLERGY & IMMUNOLOGY

## 2021-09-24 NOTE — PROGRESS NOTES
Allergy serums billed to [facility].     Vials billed below:    Vial Color Content                      Vial Size Expiration Date  Green 1:1,000, Weeds                              5mL  12/24/2021   Blue 1:100,   Weeds                              5mL  3/24/2022  Yellow 1:10   Weeds                              5mL  9/24/2022  Red 1:1                         Weeds                              5mL 9/24/2022    Checked by Zac MEJIA  30 units billed  Signature  Lizeth Gutierrez RN

## 2021-09-25 ENCOUNTER — HEALTH MAINTENANCE LETTER (OUTPATIENT)
Age: 31
End: 2021-09-25

## 2021-10-05 NOTE — PROGRESS NOTES
Called patient.    Patient states she would like to cluster.    Scheduled patient for appointments.    Advised patient that writer would send instructions via my chart.  Advised patient that she must premedicate correctly and bring her unexpired epi pen with her to every appointment.  Advised patient no strenuous activity for 2 hours after her allergy shot injections.  Patient verbalized good understanding.    Roslyn ARIAS RN

## 2021-10-05 NOTE — PROGRESS NOTES
Allergy serums received at [facility].     Vials received below:    Vial Color Content                      Vial Size Expiration Date  Green 1:1,000 Molds 5 mL  12-  Green 1:1,000 Weeds 5 mL  12-  Blue 1:100 Molds 5mL  03-  Blue 1:100 Weeds 5mL  03-      Vial Color Content                      Vial Size Expiration Date  Yellow 1:10 Molds 5mL  09-  Yellow 1:10 Weeds 5mL  09-  Red 1:1 Molds 5mL  09-  Red 1:1 Weeds 5mL  09-      Zhao Cash RN

## 2021-11-01 ENCOUNTER — OFFICE VISIT (OUTPATIENT)
Dept: ALLERGY | Facility: CLINIC | Age: 31
End: 2021-11-01
Payer: COMMERCIAL

## 2021-11-01 VITALS — DIASTOLIC BLOOD PRESSURE: 91 MMHG | OXYGEN SATURATION: 97 % | SYSTOLIC BLOOD PRESSURE: 119 MMHG | HEART RATE: 95 BPM

## 2021-11-01 DIAGNOSIS — J30.1 SEASONAL ALLERGIC RHINITIS DUE TO POLLEN: Primary | ICD-10-CM

## 2021-11-01 DIAGNOSIS — J30.89 ALLERGIC RHINITIS DUE TO MOLD: ICD-10-CM

## 2021-11-01 PROCEDURE — 99207 PR DROP WITH A PROCEDURE: CPT | Performed by: ALLERGY & IMMUNOLOGY

## 2021-11-01 PROCEDURE — 95180 RAPID DESENSITIZATION: CPT | Performed by: ALLERGY & IMMUNOLOGY

## 2021-11-01 NOTE — PROGRESS NOTES
Prior to initiation of cluster immunotherapy RN ensured that patient was feeling healthy, has premedicated with Zyrtec or Allegra yesterday twice daily and this morning. RN also ensured that patient has unexpired Epi-Pen, had no new medication changes, and did not have a reaction after the last allergy shots were given. If the patient has asthma it is well-controlled. The patient has not been ill in the past 7 days. Patient was given allergy injections per cluster immunotherapy protocol 30 minutes apart and vital signs were monitored. Patient was monitored in clinic for 30 minutes after last injection was given and assessed by provider before discharging.     Mary Beth Ortega RN

## 2021-11-01 NOTE — PROGRESS NOTES
Pebbles Lowry was seen in the Allergy Clinic at [facility].      Pebbles Lowry is a 31 year old American female who is seen today for her initial cluster immunotherapy visit. She has been feeling well and has not had any recent fevers or illness. Pebbles pre-medicated with cetirizine as directed prior to today's visit.    History reviewed. No pertinent past medical history.  History reviewed. No pertinent family history.  Social History     Tobacco Use     Smoking status: Never Smoker     Smokeless tobacco: Never Used   Substance Use Topics     Alcohol use: None     Drug use: None     Social History     Social History Narrative     Not on file       Past medical, family, and social history were reviewed.    REVIEW OF SYSTEMS:  General: negative for weight gain. negative for weight loss. negative for changes in sleep.   Eyes: negative for itching. negative for redness. negative for tearing/watering. negative for vision changes  Ears: negative for fullness. negative for hearing loss. negative for dizziness.   Nose: negative for snoring.negative for changes in smell. negative for drainage.   Throat: negative for hoarseness. negative for sore throat. negative for trouble swallowing.   Lungs: positive  for cough. negative for shortness of breath.negative for wheezing. negative for sputum production.   Cardiovascular: negative for chest pain. negative for swelling of ankles. negative for fast or irregular heartbeat.   Gastrointestinal: negative for nausea. negative for heartburn. negative for acid reflux.   Musculoskeletal: negative for joint pain. negative for joint stiffness. negative for joint swelling.   Neurologic: negative for seizures. negative for fainting. negative for weakness.   Psychiatric: negative for changes in mood. negative for anxiety.   Endocrine: negative for cold intolerance. negative for heat intolerance. negative for tremors.   Hematologic: negative for easy bruising. negative  for easy bleeding.  Integumentary: negative for rash. negative for scaling. negative for nail changes.       Current Outpatient Medications:      cetirizine (ZYRTEC) 10 MG tablet, Take 10 mg by mouth daily , Disp: , Rfl:      Docosahexaenoic Acid (PRENATAL DHA) 200 MG capsule, Take 200 mg by mouth daily, Disp: , Rfl:      fluticasone (FLONASE) 50 MCG/ACT nasal spray, Spray 1 spray into both nostrils daily, Disp: , Rfl:      NABUMETONE 500 MG PO TABS, 1 TABLET TWICE DAILY, Disp: 60 Tab, Rfl: 0     olopatadine (PATADAY) 0.2 % ophthalmic solution, 1 drop daily, Disp: , Rfl:      ORDER FOR ALLERGEN IMMUNOTHERAPY, Name of Mix: Mix #1  Mold Alternaria Tenuis 1:10 w/v, HS  0.5 ml Epicoccum Nigrum 1:10 w/v, HS 0.5 ml Diluent: HSA qs to 5ml, Disp: 5 mL, Rfl: PRN     ORDER FOR ALLERGEN IMMUNOTHERAPY, Name of Mix: Mix #2  Weeds Ragweed Mixed 1:20 w/v ALK  0.5 ml Diluent: HSA qs to 5ml, Disp: 5 mL, Rfl: PRN     ORDER FOR DME, left elastic knee brace, Disp: 1 Device, Rfl: 0     RECLIPSEN 0.15-30 MG-MCG PO TABS, , Disp: , Rfl:      UNKNOWN MED DOSAGE, med for anxiety prn, Disp: , Rfl:      EPINEPHrine (AUVI-Q) 0.3 MG/0.3ML injection 2-pack, Inject 0.3 mLs (0.3 mg) into the muscle once as needed for anaphylaxis (Patient not taking: Reported on 11/1/2021), Disp: 2 each, Rfl: 2  No Known Allergies    EXAM:   BP (!) 141/93 (BP Location: Right arm, Patient Position: Sitting, Cuff Size: Adult Large)   Pulse 102   SpO2 98%   GENERAL APPEARANCE: alert, cooperative and not in distress  SKIN: no rashes, no lesions  HEAD: atraumatic, normocephalic  EYES: lids and lashes normal  ENT: no scars or lesions, tongue midline and normal, soft palate, uvula, and tonsils normal  NECK: no asymmetry, masses, or scars  LUNGS: unlabored respirations, no intercostal retractions or accessory muscle use, clear to auscultation without rales or wheezes  HEART: regular rate and rhythm without murmurs and normal S1 and S2  MUSCULOSKELETAL: no musculoskeletal  defects are noted  NEURO: no focal deficits noted  PSYCH: does not appear depressed or anxious      WORKUP:  Cluster Immunotherapy  Cluster Allergen Immunotherapy:    After explaining risks and benefits, and obtaining verbal and written consent, we proceeded with cluster immunotherapy.     VISIT  VIAL COLOR/STRENGTH  DOSES TO BE GIVEN    1  GREEN (1:1000), BLUE (1:100)  GREEN 0.1, GREEN 0.2, GREEN 0.4, BLUE 0.1    2  BLUE (1:100), YELLOW (1:10)  BLUE 0.2, BLUE 0.4, YELLOW 0.05    3  YELLOW (1:10)  YELLOW 0.1, YELLOW 0.15, YELLOW 0.25    4  YELLOW (1:10)  YELLOW 0.35, YELLOW 0.5    5  RED (1:1)  RED 0.05, RED 0.1    6  RED (1:1)  RED 0.15, RED 0.2    7  RED (1:1)  RED 0.3, RED 0.4    8  RED (1:1)  RED 0.5        VISIT 1    Time Injection Given: 8:10  Green 1:1,000   Molds    0.1 mL  Green 1:1,000   Weeds    0.1 mL    Time Injection Given: 8:45  Green 1:1,000   Molds    0.2 mL  Green 1:1,000   Weeds    0.2 mL    Time Injection Given: 9:19  Green 1:1,000   Molds    0.4 mL  Green 1:1,000   Weeds    0.4 mL    Time Injection Given: 9:55  Blue 1:100   Molds    0.1 mL  Blue 1:100   Weeds    0.1 mL      Start Time: 8:10  End Time: 10:25        VITALS   Time BP Pulse pOx Reaction Treatment   8:40 120/89 94 98% none n/a   9:15 119/91 95 97% none n/a   9:50 119/85 90 97% none n/a   10:25 132/88 94 97% none n/a       ASSESSMENT/PLAN:  Pebbles Lowry is a 31 year old female here for cluster immunotherapy.    1. Seasonal allergic rhinitis due to pollen - Pebbles tolerated today's procedure well without developing any signs or symptoms of an adverse reaction.    - return in 7-14 days to continue cluster protocol  - continue pre-medication with cetirizine as directed  - RAPID DESENSITIZATION    2. Allergic rhinitis due to mold    - RAPID DESENSITIZATION      Thank you for allowing me to participate in the care of Pebbles Lowry.      Hemalatha Jimenes MD, FAAAAI  Allergy/Immunology  [facility]  [facility]      Chart documentation done in part with Dragon Voice Recognition Software. Although reviewed after completion, some word and grammatical errors may remain.

## 2021-11-01 NOTE — LETTER
11/1/2021         RE: Pebbles Lowry  [address] [place] [address]        Dear Colleague,    Thank you for referring your patient, Pebbles Lowry, to the [facility]. Please see a copy of my visit note below.    Pebbles Lowry was seen in the Allergy Clinic at [facility].      Pebbles Lowry is a 31 year old American female who is seen today for her initial cluster immunotherapy visit. She has been feeling well and has not had any recent fevers or illness. Pebbles pre-medicated with cetirizine as directed prior to today's visit.    History reviewed. No pertinent past medical history.  History reviewed. No pertinent family history.  Social History     Tobacco Use     Smoking status: Never Smoker     Smokeless tobacco: Never Used   Substance Use Topics     Alcohol use: None     Drug use: None     Social History     Social History Narrative     Not on file       Past medical, family, and social history were reviewed.    REVIEW OF SYSTEMS:  General: negative for weight gain. negative for weight loss. negative for changes in sleep.   Eyes: negative for itching. negative for redness. negative for tearing/watering. negative for vision changes  Ears: negative for fullness. negative for hearing loss. negative for dizziness.   Nose: negative for snoring.negative for changes in smell. negative for drainage.   Throat: negative for hoarseness. negative for sore throat. negative for trouble swallowing.   Lungs: positive  for cough. negative for shortness of breath.negative for wheezing. negative for sputum production.   Cardiovascular: negative for chest pain. negative for swelling of ankles. negative for fast or irregular heartbeat.   Gastrointestinal: negative for nausea. negative for heartburn. negative for acid reflux.   Musculoskeletal: negative for joint pain. negative for joint stiffness. negative for joint swelling.   Neurologic: negative for seizures. negative for  fainting. negative for weakness.   Psychiatric: negative for changes in mood. negative for anxiety.   Endocrine: negative for cold intolerance. negative for heat intolerance. negative for tremors.   Hematologic: negative for easy bruising. negative for easy bleeding.  Integumentary: negative for rash. negative for scaling. negative for nail changes.       Current Outpatient Medications:      cetirizine (ZYRTEC) 10 MG tablet, Take 10 mg by mouth daily , Disp: , Rfl:      Docosahexaenoic Acid (PRENATAL DHA) 200 MG capsule, Take 200 mg by mouth daily, Disp: , Rfl:      fluticasone (FLONASE) 50 MCG/ACT nasal spray, Spray 1 spray into both nostrils daily, Disp: , Rfl:      NABUMETONE 500 MG PO TABS, 1 TABLET TWICE DAILY, Disp: 60 Tab, Rfl: 0     olopatadine (PATADAY) 0.2 % ophthalmic solution, 1 drop daily, Disp: , Rfl:      ORDER FOR ALLERGEN IMMUNOTHERAPY, Name of Mix: Mix #1  Mold Alternaria Tenuis 1:10 w/v, HS  0.5 ml Epicoccum Nigrum 1:10 w/v, HS 0.5 ml Diluent: HSA qs to 5ml, Disp: 5 mL, Rfl: PRN     ORDER FOR ALLERGEN IMMUNOTHERAPY, Name of Mix: Mix #2  Weeds Ragweed Mixed 1:20 w/v ALK  0.5 ml Diluent: HSA qs to 5ml, Disp: 5 mL, Rfl: PRN     ORDER FOR DME, left elastic knee brace, Disp: 1 Device, Rfl: 0     RECLIPSEN 0.15-30 MG-MCG PO TABS, , Disp: , Rfl:      UNKNOWN MED DOSAGE, med for anxiety prn, Disp: , Rfl:      EPINEPHrine (AUVI-Q) 0.3 MG/0.3ML injection 2-pack, Inject 0.3 mLs (0.3 mg) into the muscle once as needed for anaphylaxis (Patient not taking: Reported on 11/1/2021), Disp: 2 each, Rfl: 2  No Known Allergies    EXAM:   BP (!) 141/93 (BP Location: Right arm, Patient Position: Sitting, Cuff Size: Adult Large)   Pulse 102   SpO2 98%   GENERAL APPEARANCE: alert, cooperative and not in distress  SKIN: no rashes, no lesions  HEAD: atraumatic, normocephalic  EYES: lids and lashes normal  ENT: no scars or lesions, tongue midline and normal, soft palate, uvula, and tonsils normal  NECK: no asymmetry,  masses, or scars  LUNGS: unlabored respirations, no intercostal retractions or accessory muscle use, clear to auscultation without rales or wheezes  HEART: regular rate and rhythm without murmurs and normal S1 and S2  MUSCULOSKELETAL: no musculoskeletal defects are noted  NEURO: no focal deficits noted  PSYCH: does not appear depressed or anxious      WORKUP:  Cluster Immunotherapy  Cluster Allergen Immunotherapy:    After explaining risks and benefits, and obtaining verbal and written consent, we proceeded with cluster immunotherapy.     VISIT  VIAL COLOR/STRENGTH  DOSES TO BE GIVEN    1  GREEN (1:1000), BLUE (1:100)  GREEN 0.1, GREEN 0.2, GREEN 0.4, BLUE 0.1    2  BLUE (1:100), YELLOW (1:10)  BLUE 0.2, BLUE 0.4, YELLOW 0.05    3  YELLOW (1:10)  YELLOW 0.1, YELLOW 0.15, YELLOW 0.25    4  YELLOW (1:10)  YELLOW 0.35, YELLOW 0.5    5  RED (1:1)  RED 0.05, RED 0.1    6  RED (1:1)  RED 0.15, RED 0.2    7  RED (1:1)  RED 0.3, RED 0.4    8  RED (1:1)  RED 0.5        VISIT 1    Time Injection Given: 8:10  Green 1:1,000   Molds    0.1 mL  Green 1:1,000   Weeds    0.1 mL    Time Injection Given: 8:45  Green 1:1,000   Molds    0.2 mL  Green 1:1,000   Weeds    0.2 mL    Time Injection Given: 9:19  Green 1:1,000   Molds    0.4 mL  Green 1:1,000   Weeds    0.4 mL    Time Injection Given: 9:55  Blue 1:100   Molds    0.1 mL  Blue 1:100   Weeds    0.1 mL      Start Time: 8:10  End Time: 10:25        VITALS   Time BP Pulse pOx Reaction Treatment   8:40 120/89 94 98% none n/a   9:15 119/91 95 97% none n/a   9:50 119/85 90 97% none n/a   10:25 132/88 94 97% none n/a       ASSESSMENT/PLAN:  Pebbles Lowry is a 31 year old female here for cluster immunotherapy.    1. Seasonal allergic rhinitis due to pollen - Pebbles tolerated today's procedure well without developing any signs or symptoms of an adverse reaction.    - return in 7-14 days to continue cluster protocol  - continue pre-medication with cetirizine as directed  - RAPID  DESENSITIZATION    2. Allergic rhinitis due to mold    - RAPID DESENSITIZATION      Thank you for allowing me to participate in the care of Pebbles Lowry.      Hemalatha Jimenes MD, FAAAA  Allergy/Immunology  [facility]      Chart documentation done in part with Dragon Voice Recognition Software. Although reviewed after completion, some word and grammatical errors may remain.    Prior to initiation of cluster immunotherapy RN ensured that patient was feeling healthy, has premedicated with Zyrtec or Allegra yesterday twice daily and this morning. RN also ensured that patient has unexpired Epi-Pen, had no new medication changes, and did not have a reaction after the last allergy shots were given. If the patient has asthma it is well-controlled. The patient has not been ill in the past 7 days. Patient was given allergy injections per cluster immunotherapy protocol 30 minutes apart and vital signs were monitored. Patient was monitored in clinic for 30 minutes after last injection was given and assessed by provider before discharging.     Mary Beth Ortega RN      Again, thank you for allowing me to participate in the care of your patient.        Sincerely,        Hemalatha Jimenes MD

## 2021-11-08 ENCOUNTER — OFFICE VISIT (OUTPATIENT)
Dept: ALLERGY | Facility: CLINIC | Age: 31
End: 2021-11-08
Payer: COMMERCIAL

## 2021-11-08 DIAGNOSIS — R05.9 COUGH: Primary | ICD-10-CM

## 2021-11-08 DIAGNOSIS — J45.30 MILD PERSISTENT ASTHMA WITHOUT COMPLICATION: ICD-10-CM

## 2021-11-08 PROCEDURE — 99214 OFFICE O/P EST MOD 30 MIN: CPT | Performed by: ALLERGY & IMMUNOLOGY

## 2021-11-08 RX ORDER — MONTELUKAST SODIUM 10 MG/1
10 TABLET ORAL DAILY
COMMUNITY
Start: 2020-12-21 | End: 2022-08-01

## 2021-11-08 RX ORDER — ALBUTEROL SULFATE 90 UG/1
2 AEROSOL, METERED RESPIRATORY (INHALATION) EVERY 4 HOURS PRN
COMMUNITY
Start: 2021-09-02

## 2021-11-08 NOTE — LETTER
11/8/2021         RE: Pebblse Lowry  [address] [place] [address]        Dear Colleague,    Thank you for referring your patient, Pebbles Lowry, to the [facility]. Please see a copy of my visit note below.    Pebbles Lowry was seen in the Allergy Clinic at [facility].      Pebbles Lowry is a 31 year old American female who is seen today for a follow-up visit. She began coughing several hours after last week's cluster immunotherapy visit. She has had a persistent lingering cough which she attributes to post-nasal drainage. She has not had any fevers, shortness of breath, or wheezing. She otherwise feels well. She has been tested for COVID twice and these tests were both negative. Her father has had a cold and she isn't sure if her current cough is due to having a cold or post-nasal drainage. She did use her albuterol inhaler twice during this time and didn't feel it was helpful. Pebbles was up last night due to her cough and has been coughing this morning.    Kavya reports she has previously been diagnosed with asthma. She has seen Dr. Lazaro in the pulmonary medicine clinic through [facility] in the past. Her previous treatments had included Advair and montelukast however she stopped these medications approximately 6 months ago. She states that her symptoms were well controlled at the time and she was advised that these medications could be taken on an as-needed basis and did not need to be continued regularly.    History reviewed. No pertinent past medical history.  History reviewed. No pertinent family history.  Social History     Tobacco Use     Smoking status: Never Smoker     Smokeless tobacco: Never Used   Substance Use Topics     Alcohol use: None     Drug use: None     Social History     Social History Narrative     Not on file       Past medical, family, and social history were reviewed.    REVIEW OF SYSTEMS:  General: negative for weight  gain. negative for weight loss. negative for changes in sleep.   Eyes: negative for itching. negative for redness. negative for tearing/watering. negative for vision changes  Ears: negative for fullness. negative for hearing loss. negative for dizziness.   Nose: negative for snoring.negative for changes in smell. positive for drainage.   Throat: negative for hoarseness. negative for sore throat. negative for trouble swallowing.   Lungs: positive for cough. negative for shortness of breath.negative for wheezing. negative for sputum production.   Cardiovascular: negative for chest pain. negative for swelling of ankles. negative for fast or irregular heartbeat.   Gastrointestinal: negative for nausea. negative for heartburn. negative for acid reflux.   Musculoskeletal: negative for joint pain. negative for joint stiffness. negative for joint swelling.   Neurologic: negative for seizures. negative for fainting. negative for weakness.   Psychiatric: negative for changes in mood. negative for anxiety.   Endocrine: negative for cold intolerance. negative for heat intolerance. negative for tremors.   Hematologic: negative for easy bruising. negative for easy bleeding.  Integumentary: negative for rash. negative for scaling. negative for nail changes.       Current Outpatient Medications:      albuterol (PROAIR HFA/PROVENTIL HFA/VENTOLIN HFA) 108 (90 Base) MCG/ACT inhaler, Inhale 2 puffs into the lungs every 4 hours as needed, Disp: , Rfl:      cetirizine (ZYRTEC) 10 MG tablet, Take 10 mg by mouth daily , Disp: , Rfl:      Docosahexaenoic Acid (PRENATAL DHA) 200 MG capsule, Take 200 mg by mouth daily, Disp: , Rfl:      fluticasone (FLONASE) 50 MCG/ACT nasal spray, Spray 1 spray into both nostrils daily, Disp: , Rfl:      NABUMETONE 500 MG PO TABS, 1 TABLET TWICE DAILY, Disp: 60 Tab, Rfl: 0     olopatadine (PATADAY) 0.2 % ophthalmic solution, 1 drop daily, Disp: , Rfl:      ORDER FOR ALLERGEN IMMUNOTHERAPY, Name of Mix: Mix  #1  Mold Alternaria Tenuis 1:10 w/v, HS  0.5 ml Epicoccum Nigrum 1:10 w/v, HS 0.5 ml Diluent: HSA qs to 5ml, Disp: 5 mL, Rfl: PRN     ORDER FOR ALLERGEN IMMUNOTHERAPY, Name of Mix: Mix #2  Weeds Ragweed Mixed 1:20 w/v ALK  0.5 ml Diluent: HSA qs to 5ml, Disp: 5 mL, Rfl: PRN     ORDER FOR DME, left elastic knee brace, Disp: 1 Device, Rfl: 0     RECLIPSEN 0.15-30 MG-MCG PO TABS, , Disp: , Rfl:      UNKNOWN MED DOSAGE, med for anxiety prn, Disp: , Rfl:      EPINEPHrine (AUVI-Q) 0.3 MG/0.3ML injection 2-pack, Inject 0.3 mLs (0.3 mg) into the muscle once as needed for anaphylaxis (Patient not taking: Reported on 11/1/2021), Disp: 2 each, Rfl: 2     montelukast (SINGULAIR) 10 MG tablet, 10 mg daily, Disp: , Rfl:   No Known Allergies    EXAM:   There were no vitals taken for this visit.  GENERAL APPEARANCE: alert, cooperative and not in distress  SKIN: no rashes, no lesions  HEAD: atraumatic, normocephalic  EYES: lids and lashes normal  ENT: no scars or lesions, tongue midline and normal, soft palate, uvula, and tonsils normal  NECK: no asymmetry, masses, or scars  LUNGS: unlabored respirations, no intercostal retractions or accessory muscle use, clear to auscultation without rales or wheezes  HEART: regular rate and rhythm without murmurs and normal S1 and S2  MUSCULOSKELETAL: no musculoskeletal defects are noted  NEURO: no focal deficits noted  PSYCH: does not appear depressed or anxious      WORKUP:  None    ASSESSMENT/PLAN:  Pebbles Lowry is a 31 year old female here for a follow-up visit.    1. Cough - Pebbles reports she began coughing several hours after receiving immunotherapy injections last week. Her cough has persisted though she feels this is due to post-nasal drainage. She does not have associated shortness of breath, chest tightness, or wheezing.    - resume Advair 100/50mcg - 1 puff twice daily  - resume montelukast - 10mg daily  - take 2 to 4 puffs of albuterol HFA every 4 hours as needed  - if symptoms have  improved will plan for an injection visit next Monday and to continue with cluster protocol for immunotherapy on 11/18/21 as scheduled  - fluticasone-salmeterol (ADVAIR) 100-50 MCG/DOSE inhaler; Inhale 1 puff into the lungs every 12 hours    2. Mild persistent asthma without complication - see above    - fluticasone-salmeterol (ADVAIR) 100-50 MCG/DOSE inhaler; Inhale 1 puff into the lungs every 12 hours      Thank you for allowing me to participate in the care of Pebbles Lowry.      Hemalatha Jimenes MD, FAAAAI  Allergy/Immunology  [facility]      Chart documentation done in part with Dragon Voice Recognition Software. Although reviewed after completion, some word and grammatical errors may remain.      Again, thank you for allowing me to participate in the care of your patient.        Sincerely,        Hemalatha Jimenes MD

## 2021-11-08 NOTE — PATIENT INSTRUCTIONS
If you have any questions regarding your allergies, asthma, or what we discussed during your visit today please call the allergy clinic or contact us via [employer].    [facility] Allergy RN Line: [phone] - call this number with any questions during or after business/clinic hours  [facility] Scheduling Line: [phone]  [facility] Scheduling Line: [phone] - this number is ONLY for scheduling at the [facility] and should not be used to get in touch with the allergy team    All visits for food challenges, medication/drug allergy testing, and drug challenges MUST be scheduled through the allergy clinic nurse. Please call the nurse at [phone] or send a [employer] message for scheduling. Appointments for these visits that are made through the schedulers or via [employer] may be cancelled or rescheduled.    Clinic Schedule:   [place] - Monday, Tuesday, and Thursday  [address] [place] [address]    [facility] - Wednesday  [facility] [address] [place] [address]      1. Restart the Advair - 1 puff twice a day. Rinse your mouth and brush your teeth afterwards.    2. Restart the montelukast - 1 tablet daily    3. Use the albuterol inhaler every 4 hours as needed    4. We will reach out to you about scheduling a regular allergy shot visit on 11/15/21. If you are still feeling well we will continue the cluster build on 11/18/21 as scheduled.

## 2021-11-08 NOTE — PROGRESS NOTES
Pebbles Lowry was seen in the Allergy Clinic at [facility].      Pebbles Lowry is a 31 year old American female who is seen today for a follow-up visit. She began coughing several hours after last week's cluster immunotherapy visit. She has had a persistent lingering cough which she attributes to post-nasal drainage. She has not had any fevers, shortness of breath, or wheezing. She otherwise feels well. She has been tested for COVID twice and these tests were both negative. Her father has had a cold and she isn't sure if her current cough is due to having a cold or post-nasal drainage. She did use her albuterol inhaler twice during this time and didn't feel it was helpful. Pebbles was up last night due to her cough and has been coughing this morning.    Kavya reports she has previously been diagnosed with asthma. She has seen Dr. Lazaro in the pulmonary medicine clinic through [facility] in the past. Her previous treatments had included Advair and montelukast however she stopped these medications approximately 6 months ago. She states that her symptoms were well controlled at the time and she was advised that these medications could be taken on an as-needed basis and did not need to be continued regularly.    History reviewed. No pertinent past medical history.  History reviewed. No pertinent family history.  Social History     Tobacco Use     Smoking status: Never Smoker     Smokeless tobacco: Never Used   Substance Use Topics     Alcohol use: None     Drug use: None     Social History     Social History Narrative     Not on file       Past medical, family, and social history were reviewed.    REVIEW OF SYSTEMS:  General: negative for weight gain. negative for weight loss. negative for changes in sleep.   Eyes: negative for itching. negative for redness. negative for tearing/watering. negative for vision changes  Ears: negative for fullness. negative for hearing loss. negative for  dizziness.   Nose: negative for snoring.negative for changes in smell. positive for drainage.   Throat: negative for hoarseness. negative for sore throat. negative for trouble swallowing.   Lungs: positive for cough. negative for shortness of breath.negative for wheezing. negative for sputum production.   Cardiovascular: negative for chest pain. negative for swelling of ankles. negative for fast or irregular heartbeat.   Gastrointestinal: negative for nausea. negative for heartburn. negative for acid reflux.   Musculoskeletal: negative for joint pain. negative for joint stiffness. negative for joint swelling.   Neurologic: negative for seizures. negative for fainting. negative for weakness.   Psychiatric: negative for changes in mood. negative for anxiety.   Endocrine: negative for cold intolerance. negative for heat intolerance. negative for tremors.   Hematologic: negative for easy bruising. negative for easy bleeding.  Integumentary: negative for rash. negative for scaling. negative for nail changes.       Current Outpatient Medications:      albuterol (PROAIR HFA/PROVENTIL HFA/VENTOLIN HFA) 108 (90 Base) MCG/ACT inhaler, Inhale 2 puffs into the lungs every 4 hours as needed, Disp: , Rfl:      cetirizine (ZYRTEC) 10 MG tablet, Take 10 mg by mouth daily , Disp: , Rfl:      Docosahexaenoic Acid (PRENATAL DHA) 200 MG capsule, Take 200 mg by mouth daily, Disp: , Rfl:      fluticasone (FLONASE) 50 MCG/ACT nasal spray, Spray 1 spray into both nostrils daily, Disp: , Rfl:      NABUMETONE 500 MG PO TABS, 1 TABLET TWICE DAILY, Disp: 60 Tab, Rfl: 0     olopatadine (PATADAY) 0.2 % ophthalmic solution, 1 drop daily, Disp: , Rfl:      ORDER FOR ALLERGEN IMMUNOTHERAPY, Name of Mix: Mix #1  Mold Alternaria Tenuis 1:10 w/v, HS  0.5 ml Epicoccum Nigrum 1:10 w/v, HS 0.5 ml Diluent: HSA qs to 5ml, Disp: 5 mL, Rfl: PRN     ORDER FOR ALLERGEN IMMUNOTHERAPY, Name of Mix: Mix #2  Weeds Ragweed Mixed 1:20 w/v ALK  0.5 ml Diluent: HSA qs  to 5ml, Disp: 5 mL, Rfl: PRN     ORDER FOR DME, left elastic knee brace, Disp: 1 Device, Rfl: 0     RECLIPSEN 0.15-30 MG-MCG PO TABS, , Disp: , Rfl:      UNKNOWN MED DOSAGE, med for anxiety prn, Disp: , Rfl:      EPINEPHrine (AUVI-Q) 0.3 MG/0.3ML injection 2-pack, Inject 0.3 mLs (0.3 mg) into the muscle once as needed for anaphylaxis (Patient not taking: Reported on 11/1/2021), Disp: 2 each, Rfl: 2     montelukast (SINGULAIR) 10 MG tablet, 10 mg daily, Disp: , Rfl:   No Known Allergies    EXAM:   There were no vitals taken for this visit.  GENERAL APPEARANCE: alert, cooperative and not in distress  SKIN: no rashes, no lesions  HEAD: atraumatic, normocephalic  EYES: lids and lashes normal  ENT: no scars or lesions, tongue midline and normal, soft palate, uvula, and tonsils normal  NECK: no asymmetry, masses, or scars  LUNGS: unlabored respirations, no intercostal retractions or accessory muscle use, clear to auscultation without rales or wheezes  HEART: regular rate and rhythm without murmurs and normal S1 and S2  MUSCULOSKELETAL: no musculoskeletal defects are noted  NEURO: no focal deficits noted  PSYCH: does not appear depressed or anxious      WORKUP:  None    ASSESSMENT/PLAN:  Pebbles Lowry is a 31 year old female here for a follow-up visit.    1. Cough - Pebbles reports she began coughing several hours after receiving immunotherapy injections last week. Her cough has persisted though she feels this is due to post-nasal drainage. She does not have associated shortness of breath, chest tightness, or wheezing.    - resume Advair 100/50mcg - 1 puff twice daily  - resume montelukast - 10mg daily  - take 2 to 4 puffs of albuterol HFA every 4 hours as needed  - if symptoms have improved will plan for an injection visit next Monday and to continue with cluster protocol for immunotherapy on 11/18/21 as scheduled  - fluticasone-salmeterol (ADVAIR) 100-50 MCG/DOSE inhaler; Inhale 1 puff into the lungs every 12 hours    2.  Mild persistent asthma without complication - see above    - fluticasone-salmeterol (ADVAIR) 100-50 MCG/DOSE inhaler; Inhale 1 puff into the lungs every 12 hours      Thank you for allowing me to participate in the care of Pebbles Lowry.      Hemalatha Jimenes MD, FAAAAI  Allergy/Immunology  [facility]      Chart documentation done in part with Dragon Voice Recognition Software. Although reviewed after completion, some word and grammatical errors may remain.

## 2021-11-15 ENCOUNTER — ALLIED HEALTH/NURSE VISIT (OUTPATIENT)
Dept: ALLERGY | Facility: CLINIC | Age: 31
End: 2021-11-15
Payer: COMMERCIAL

## 2021-11-15 DIAGNOSIS — J30.1 SEASONAL ALLERGIC RHINITIS DUE TO POLLEN: Primary | ICD-10-CM

## 2021-11-15 PROCEDURE — 95117 IMMUNOTHERAPY INJECTIONS: CPT

## 2021-11-15 NOTE — PROGRESS NOTES
Patient presented after waiting 30 minutes with no reaction to allergy injections. Discharged from clinic.    Roslyn ARIAS RN

## 2021-11-18 ENCOUNTER — OFFICE VISIT (OUTPATIENT)
Dept: ALLERGY | Facility: CLINIC | Age: 31
End: 2021-11-18
Payer: COMMERCIAL

## 2021-11-18 VITALS — DIASTOLIC BLOOD PRESSURE: 92 MMHG | HEART RATE: 97 BPM | SYSTOLIC BLOOD PRESSURE: 128 MMHG | OXYGEN SATURATION: 98 %

## 2021-11-18 DIAGNOSIS — J30.89 ALLERGIC RHINITIS DUE TO MOLD: ICD-10-CM

## 2021-11-18 DIAGNOSIS — J30.1 SEASONAL ALLERGIC RHINITIS DUE TO POLLEN: ICD-10-CM

## 2021-11-18 DIAGNOSIS — J45.30 MILD PERSISTENT ASTHMA WITHOUT COMPLICATION: Primary | ICD-10-CM

## 2021-11-18 PROCEDURE — 99213 OFFICE O/P EST LOW 20 MIN: CPT | Mod: 25 | Performed by: ALLERGY & IMMUNOLOGY

## 2021-11-18 PROCEDURE — 95180 RAPID DESENSITIZATION: CPT | Performed by: ALLERGY & IMMUNOLOGY

## 2021-11-18 NOTE — PROGRESS NOTES
Prior to initiation of cluster immunotherapy RN ensured that patient was feeling healthy, has premedicated with Zyrtec or Allegra yesterday twice daily and this morning. RN also ensured that patient has unexpired Epi-Pen, had no new medication changes, and did not have a reaction after the last allergy shots were given. If the patient has asthma it is well-controlled. The patient has not been ill in the past 7 days. Patient was given allergy injections per cluster immunotherapy protocol 30 minutes apart and vital signs were monitored. Patient was monitored in clinic for 30 minutes after last injection was given and assessed by provider before discharging.     Roslyn ARIAS RN

## 2021-11-18 NOTE — LETTER
11/18/2021         RE: Pebbles Lowry  [address] [place] [address]        Dear Colleague,    Thank you for referring your patient, Pebbles Lowry, to the [facility]. Please see a copy of my visit note below.    Pebbles Lowry was seen in the Allergy Clinic at [facility].      Pebbles Lowry is a 31 year old American female who is seen today for a follow-up visit. After her last visit she was re-started on Advair and montelukast for management of her asthma and cough. She reports that her symptoms have resolved and she has not needed to use albuterol in the last 2 to 3 weeks. She is not experiencing any side effects from these medications and has been sleeping well at night.    Kavya received an immunotherapy injection last Monday and had no adverse reaction. She has pre-medicated with cetirizine as directed today.      No past medical history on file.  No family history on file.  Social History     Tobacco Use     Smoking status: Never Smoker     Smokeless tobacco: Never Used   Substance Use Topics     Alcohol use: None     Drug use: None     Social History     Social History Narrative     Not on file       Past medical, family, and social history were reviewed.    REVIEW OF SYSTEMS:  General: negative for weight gain. negative for weight loss. negative for changes in sleep.   Eyes: negative for itching. negative for redness. negative for tearing/watering. negative for vision changes  Ears: negative for fullness. negative for hearing loss. negative for dizziness.   Nose: negative for snoring.negative for changes in smell. negative for drainage.   Throat: negative for hoarseness. negative for sore throat. negative for trouble swallowing.   Lungs: negative for cough. negative for shortness of breath.negative for wheezing. negative for sputum production.   Cardiovascular: negative for chest pain. negative for swelling of ankles. negative for fast or irregular  heartbeat.   Gastrointestinal: negative for nausea. negative for heartburn. negative for acid reflux.   Musculoskeletal: negative for joint pain. negative for joint stiffness. negative for joint swelling.   Neurologic: negative for seizures. negative for fainting. negative for weakness.   Psychiatric: negative for changes in mood. negative for anxiety.   Endocrine: negative for cold intolerance. negative for heat intolerance. negative for tremors.   Hematologic: negative for easy bruising. negative for easy bleeding.  Integumentary: negative for rash. negative for scaling. negative for nail changes.       Current Outpatient Medications:      albuterol (PROAIR HFA/PROVENTIL HFA/VENTOLIN HFA) 108 (90 Base) MCG/ACT inhaler, Inhale 2 puffs into the lungs every 4 hours as needed, Disp: , Rfl:      cetirizine (ZYRTEC) 10 MG tablet, Take 10 mg by mouth daily , Disp: , Rfl:      Docosahexaenoic Acid (PRENATAL DHA) 200 MG capsule, Take 200 mg by mouth daily, Disp: , Rfl:      fluticasone (FLONASE) 50 MCG/ACT nasal spray, Spray 1 spray into both nostrils daily, Disp: , Rfl:      fluticasone-salmeterol (ADVAIR) 100-50 MCG/DOSE inhaler, Inhale 1 puff into the lungs every 12 hours, Disp: , Rfl:      montelukast (SINGULAIR) 10 MG tablet, 10 mg daily, Disp: , Rfl:      NABUMETONE 500 MG PO TABS, 1 TABLET TWICE DAILY, Disp: 60 Tab, Rfl: 0     olopatadine (PATADAY) 0.2 % ophthalmic solution, 1 drop daily, Disp: , Rfl:      ORDER FOR ALLERGEN IMMUNOTHERAPY, Name of Mix: Mix #1  Mold Alternaria Tenuis 1:10 w/v, HS  0.5 ml Epicoccum Nigrum 1:10 w/v, HS 0.5 ml Diluent: HSA qs to 5ml, Disp: 5 mL, Rfl: PRN     ORDER FOR ALLERGEN IMMUNOTHERAPY, Name of Mix: Mix #2  Weeds Ragweed Mixed 1:20 w/v ALK  0.5 ml Diluent: HSA qs to 5ml, Disp: 5 mL, Rfl: PRN     ORDER FOR DME, left elastic knee brace, Disp: 1 Device, Rfl: 0     RECLIPSEN 0.15-30 MG-MCG PO TABS, , Disp: , Rfl:      UNKNOWN MED DOSAGE, med for anxiety prn, Disp: , Rfl:       EPINEPHrine (AUVI-Q) 0.3 MG/0.3ML injection 2-pack, Inject 0.3 mLs (0.3 mg) into the muscle once as needed for anaphylaxis (Patient not taking: Reported on 11/1/2021), Disp: 2 each, Rfl: 2  No Known Allergies    EXAM:   /89 (BP Location: Right arm, Patient Position: Sitting, Cuff Size: Adult Large)   Pulse 103   SpO2 98%   GENERAL APPEARANCE: alert, cooperative and not in distress  SKIN: no rashes, no lesions  HEAD: atraumatic, normocephalic  EYES: lids and lashes normal  ENT: no scars or lesions, tongue midline and normal, soft palate, uvula, and tonsils normal  NECK: no asymmetry, masses, or scars  LUNGS: unlabored respirations, no intercostal retractions or accessory muscle use, clear to auscultation without rales or wheezes  HEART: regular rate and rhythm without murmurs and normal S1 and S2  MUSCULOSKELETAL: no musculoskeletal defects are noted  NEURO: no focal deficits noted  PSYCH: does not appear depressed or anxious      WORKUP:  Cluster Immunotherapy    Cluster Allergen Immunotherapy:    After explaining risks and benefits, and obtaining verbal and written consent, we proceeded with cluster immunotherapy.     VISIT  VIAL COLOR/STRENGTH  DOSES TO BE GIVEN    1  GREEN (1:1000), BLUE (1:100)  GREEN 0.1, GREEN 0.2, GREEN 0.4, BLUE 0.1    2  BLUE (1:100), YELLOW (1:10)  BLUE 0.4, YELLOW 0.05    3  YELLOW (1:10)  YELLOW 0.1, YELLOW 0.15, YELLOW 0.25    4  YELLOW (1:10)  YELLOW 0.35, YELLOW 0.5    5  RED (1:1)  RED 0.05, RED 0.1    6  RED (1:1)  RED 0.15, RED 0.2    7  RED (1:1)  RED 0.3, RED 0.4    8  RED (1:1)  RED 0.5        VISIT 2    After explaining risks and benefits, and obtaining verbal and written consent, we proceeded with cluster immunotherapy.      Time Injection Given: 823  Blue 1:100   Weeds    0.4 mL  Blue 1:100   Molds    0.4 mL      Time Injection Given: 0900  Yellow 1:10   Weeds    0.05 mL  Yellow 1:10   Molds    0.05 mL        Start Time: 0823  End Time: 931        VITALS   Time BP  Pulse pOx Reaction Treatment   0855 135/89 103 98% none N/A   0931 128/92 97 98% none N/A     ASSESSMENT/PLAN:  Pebbles Lowry is a 31 year old female here for a follow-up visit and to continue cluster immunotherapy.    1. Mild persistent asthma without complication - Pebbles reports that her symptoms have improved since resuming Advair and montelukast. She has not needed to use albuterol in the last 2-3 weeks    - continue Advair 100/50mcg - 1 puff twice daily  - continue montelukast - 10mg daily  - take 2 to 4 puffs of albuterol HFA every 4 hours as needed    2. Seasonal allergic rhinitis due to pollen - Pebbles tolerated today's procedure well without developing any signs or symptoms of an adverse reaction.     - return in 7-14 days to continue cluster protocol  - continue pre-medication with cetirizine as directed  - RAPID DESENSITIZATION    3. Allergic rhinitis due to mold    - RAPID DESENSITIZATION      Thank you for allowing me to participate in the care of Pebbles Lowry.      Hemalatha Jimenes MD, FAAAAI  Allergy/Immunology  [facility]      Chart documentation done in part with Dragon [employer] Recognition Software. Although reviewed after completion, some word and grammatical errors may remain.    Prior to initiation of cluster immunotherapy RN ensured that patient was feeling healthy, has premedicated with Zyrtec or Allegra yesterday twice daily and this morning. RN also ensured that patient has unexpired Epi-Pen, had no new medication changes, and did not have a reaction after the last allergy shots were given. If the patient has asthma it is well-controlled. The patient has not been ill in the past 7 days. Patient was given allergy injections per cluster immunotherapy protocol 30 minutes apart and vital signs were monitored. Patient was monitored in clinic for 30 minutes after last injection was given and assessed by provider before  discharging.     Roslyn ARIAS RN          Again, thank you for allowing me to participate in the care of your patient.        Sincerely,        Hemalatha Jimenes MD

## 2021-11-18 NOTE — PROGRESS NOTES
Pebbles Lowry was seen in the Allergy Clinic at [facility].      Pebbles Lowry is a 31 year old American female who is seen today for a follow-up visit. After her last visit she was re-started on Advair and montelukast for management of her asthma and cough. She reports that her symptoms have resolved and she has not needed to use albuterol in the last 2 to 3 weeks. She is not experiencing any side effects from these medications and has been sleeping well at night.    Kavya received an immunotherapy injection last Monday and had no adverse reaction. She has pre-medicated with cetirizine as directed today.      No past medical history on file.  No family history on file.  Social History     Tobacco Use     Smoking status: Never Smoker     Smokeless tobacco: Never Used   Substance Use Topics     Alcohol use: None     Drug use: None     Social History     Social History Narrative     Not on file       Past medical, family, and social history were reviewed.    REVIEW OF SYSTEMS:  General: negative for weight gain. negative for weight loss. negative for changes in sleep.   Eyes: negative for itching. negative for redness. negative for tearing/watering. negative for vision changes  Ears: negative for fullness. negative for hearing loss. negative for dizziness.   Nose: negative for snoring.negative for changes in smell. negative for drainage.   Throat: negative for hoarseness. negative for sore throat. negative for trouble swallowing.   Lungs: negative for cough. negative for shortness of breath.negative for wheezing. negative for sputum production.   Cardiovascular: negative for chest pain. negative for swelling of ankles. negative for fast or irregular heartbeat.   Gastrointestinal: negative for nausea. negative for heartburn. negative for acid reflux.   Musculoskeletal: negative for joint pain. negative for joint stiffness. negative for joint swelling.   Neurologic: negative for seizures.  negative for fainting. negative for weakness.   Psychiatric: negative for changes in mood. negative for anxiety.   Endocrine: negative for cold intolerance. negative for heat intolerance. negative for tremors.   Hematologic: negative for easy bruising. negative for easy bleeding.  Integumentary: negative for rash. negative for scaling. negative for nail changes.       Current Outpatient Medications:      albuterol (PROAIR HFA/PROVENTIL HFA/VENTOLIN HFA) 108 (90 Base) MCG/ACT inhaler, Inhale 2 puffs into the lungs every 4 hours as needed, Disp: , Rfl:      cetirizine (ZYRTEC) 10 MG tablet, Take 10 mg by mouth daily , Disp: , Rfl:      Docosahexaenoic Acid (PRENATAL DHA) 200 MG capsule, Take 200 mg by mouth daily, Disp: , Rfl:      fluticasone (FLONASE) 50 MCG/ACT nasal spray, Spray 1 spray into both nostrils daily, Disp: , Rfl:      fluticasone-salmeterol (ADVAIR) 100-50 MCG/DOSE inhaler, Inhale 1 puff into the lungs every 12 hours, Disp: , Rfl:      montelukast (SINGULAIR) 10 MG tablet, 10 mg daily, Disp: , Rfl:      NABUMETONE 500 MG PO TABS, 1 TABLET TWICE DAILY, Disp: 60 Tab, Rfl: 0     olopatadine (PATADAY) 0.2 % ophthalmic solution, 1 drop daily, Disp: , Rfl:      ORDER FOR ALLERGEN IMMUNOTHERAPY, Name of Mix: Mix #1  Mold Alternaria Tenuis 1:10 w/v, HS  0.5 ml Epicoccum Nigrum 1:10 w/v, HS 0.5 ml Diluent: HSA qs to 5ml, Disp: 5 mL, Rfl: PRN     ORDER FOR ALLERGEN IMMUNOTHERAPY, Name of Mix: Mix #2  Weeds Ragweed Mixed 1:20 w/v ALK  0.5 ml Diluent: HSA qs to 5ml, Disp: 5 mL, Rfl: PRN     ORDER FOR DME, left elastic knee brace, Disp: 1 Device, Rfl: 0     RECLIPSEN 0.15-30 MG-MCG PO TABS, , Disp: , Rfl:      UNKNOWN MED DOSAGE, med for anxiety prn, Disp: , Rfl:      EPINEPHrine (AUVI-Q) 0.3 MG/0.3ML injection 2-pack, Inject 0.3 mLs (0.3 mg) into the muscle once as needed for anaphylaxis (Patient not taking: Reported on 11/1/2021), Disp: 2 each, Rfl: 2  No Known Allergies    EXAM:   /89 (BP Location: Right  arm, Patient Position: Sitting, Cuff Size: Adult Large)   Pulse 103   SpO2 98%   GENERAL APPEARANCE: alert, cooperative and not in distress  SKIN: no rashes, no lesions  HEAD: atraumatic, normocephalic  EYES: lids and lashes normal  ENT: no scars or lesions, tongue midline and normal, soft palate, uvula, and tonsils normal  NECK: no asymmetry, masses, or scars  LUNGS: unlabored respirations, no intercostal retractions or accessory muscle use, clear to auscultation without rales or wheezes  HEART: regular rate and rhythm without murmurs and normal S1 and S2  MUSCULOSKELETAL: no musculoskeletal defects are noted  NEURO: no focal deficits noted  PSYCH: does not appear depressed or anxious      WORKUP:  Cluster Immunotherapy    Cluster Allergen Immunotherapy:    After explaining risks and benefits, and obtaining verbal and written consent, we proceeded with cluster immunotherapy.     VISIT  VIAL COLOR/STRENGTH  DOSES TO BE GIVEN    1  GREEN (1:1000), BLUE (1:100)  GREEN 0.1, GREEN 0.2, GREEN 0.4, BLUE 0.1    2  BLUE (1:100), YELLOW (1:10)  BLUE 0.4, YELLOW 0.05    3  YELLOW (1:10)  YELLOW 0.1, YELLOW 0.15, YELLOW 0.25    4  YELLOW (1:10)  YELLOW 0.35, YELLOW 0.5    5  RED (1:1)  RED 0.05, RED 0.1    6  RED (1:1)  RED 0.15, RED 0.2    7  RED (1:1)  RED 0.3, RED 0.4    8  RED (1:1)  RED 0.5        VISIT 2    After explaining risks and benefits, and obtaining verbal and written consent, we proceeded with cluster immunotherapy.      Time Injection Given: 823  Blue 1:100   Weeds    0.4 mL  Blue 1:100   Molds    0.4 mL      Time Injection Given: 0900  Yellow 1:10   Weeds    0.05 mL  Yellow 1:10   Molds    0.05 mL        Start Time: 0823  End Time: 931        VITALS   Time BP Pulse pOx Reaction Treatment   0855 135/89 103 98% none N/A   0931 128/92 97 98% none N/A     ASSESSMENT/PLAN:  Pebbles Lowry is a 31 year old female here for a follow-up visit and to continue cluster immunotherapy.    1. Mild persistent asthma without  complication - Pebbles reports that her symptoms have improved since resuming Advair and montelukast. She has not needed to use albuterol in the last 2-3 weeks    - continue Advair 100/50mcg - 1 puff twice daily  - continue montelukast - 10mg daily  - take 2 to 4 puffs of albuterol HFA every 4 hours as needed    2. Seasonal allergic rhinitis due to pollen - Pebbles tolerated today's procedure well without developing any signs or symptoms of an adverse reaction.     - return in 7-14 days to continue cluster protocol  - continue pre-medication with cetirizine as directed  - RAPID DESENSITIZATION    3. Allergic rhinitis due to mold    - RAPID DESENSITIZATION      Thank you for allowing me to participate in the care of Pebbles Lowry.      Hemalatha Jimenes MD, FAAAAI  Allergy/Immunology  [facility]      Chart documentation done in part with Dragon Voice Recognition Software. Although reviewed after completion, some word and grammatical errors may remain.

## 2021-11-22 ENCOUNTER — OFFICE VISIT (OUTPATIENT)
Dept: ALLERGY | Facility: CLINIC | Age: 31
End: 2021-11-22
Payer: COMMERCIAL

## 2021-11-22 VITALS — HEART RATE: 103 BPM | SYSTOLIC BLOOD PRESSURE: 146 MMHG | OXYGEN SATURATION: 98 % | DIASTOLIC BLOOD PRESSURE: 91 MMHG

## 2021-11-22 DIAGNOSIS — J30.89 ALLERGIC RHINITIS DUE TO MOLD: ICD-10-CM

## 2021-11-22 DIAGNOSIS — J30.1 SEASONAL ALLERGIC RHINITIS DUE TO POLLEN: Primary | ICD-10-CM

## 2021-11-22 PROBLEM — J45.30 MILD PERSISTENT ASTHMA: Status: ACTIVE | Noted: 2021-11-08

## 2021-11-22 PROCEDURE — 95180 RAPID DESENSITIZATION: CPT | Performed by: ALLERGY & IMMUNOLOGY

## 2021-11-22 PROCEDURE — 99207 PR DROP WITH A PROCEDURE: CPT | Performed by: ALLERGY & IMMUNOLOGY

## 2021-11-22 NOTE — LETTER
11/22/2021         RE: Pebbles Lowry  [address] [place] [address]        Dear Colleague,    Thank you for referring your patient, Pebbles Lowry, to the [facility]. Please see a copy of my visit note below.    Pebbles Lowry was seen in the Allergy Clinic at [facility].      Pebbles Lowry is a 31 year old American female who is seen today for cluster immunotherapy. She had no adverse reactions after her last visit. She has been feeling well and has not had any recent fevers or illness. Pebbles pre-medicated with cetirizine as directed prior to today's visit.      History reviewed. No pertinent past medical history.  History reviewed. No pertinent family history.  Social History     Tobacco Use     Smoking status: Never Smoker     Smokeless tobacco: Never Used   Substance Use Topics     Alcohol use: None     Drug use: None     Social History     Social History Narrative     Not on file       Past medical, family, and social history were reviewed.    REVIEW OF SYSTEMS:  General: negative for weight gain. negative for weight loss. negative for changes in sleep.   Eyes: negative for itching. negative for redness. negative for tearing/watering. negative for vision changes  Ears: negative for fullness. negative for hearing loss. negative for dizziness.   Nose: negative for snoring.negative for changes in smell. negative for drainage.   Throat: negative for hoarseness. negative for sore throat. negative for trouble swallowing.   Lungs: negative for cough. negative for shortness of breath.negative for wheezing. negative for sputum production.   Cardiovascular: negative for chest pain. negative for swelling of ankles. negative for fast or irregular heartbeat.   Gastrointestinal: negative for nausea. negative for heartburn. negative for acid reflux.   Musculoskeletal: negative for joint pain. negative for joint stiffness. negative for joint swelling.   Neurologic:  negative for seizures. negative for fainting. negative for weakness.   Psychiatric: negative for changes in mood. negative for anxiety.   Endocrine: negative for cold intolerance. negative for heat intolerance. negative for tremors.   Hematologic: negative for easy bruising. negative for easy bleeding.  Integumentary: negative for rash. negative for scaling. negative for nail changes.       Current Outpatient Medications:      albuterol (PROAIR HFA/PROVENTIL HFA/VENTOLIN HFA) 108 (90 Base) MCG/ACT inhaler, Inhale 2 puffs into the lungs every 4 hours as needed, Disp: , Rfl:      cetirizine (ZYRTEC) 10 MG tablet, Take 10 mg by mouth daily , Disp: , Rfl:      Docosahexaenoic Acid (PRENATAL DHA) 200 MG capsule, Take 200 mg by mouth daily, Disp: , Rfl:      fluticasone (FLONASE) 50 MCG/ACT nasal spray, Spray 1 spray into both nostrils daily, Disp: , Rfl:      fluticasone-salmeterol (ADVAIR) 100-50 MCG/DOSE inhaler, Inhale 1 puff into the lungs every 12 hours, Disp: , Rfl:      montelukast (SINGULAIR) 10 MG tablet, 10 mg daily, Disp: , Rfl:      NABUMETONE 500 MG PO TABS, 1 TABLET TWICE DAILY, Disp: 60 Tab, Rfl: 0     olopatadine (PATADAY) 0.2 % ophthalmic solution, 1 drop daily, Disp: , Rfl:      ORDER FOR ALLERGEN IMMUNOTHERAPY, Name of Mix: Mix #1  Mold Alternaria Tenuis 1:10 w/v, HS  0.5 ml Epicoccum Nigrum 1:10 w/v, HS 0.5 ml Diluent: HSA qs to 5ml, Disp: 5 mL, Rfl: PRN     ORDER FOR ALLERGEN IMMUNOTHERAPY, Name of Mix: Mix #2  Weeds Ragweed Mixed 1:20 w/v ALK  0.5 ml Diluent: HSA qs to 5ml, Disp: 5 mL, Rfl: PRN     ORDER FOR DME, left elastic knee brace, Disp: 1 Device, Rfl: 0     RECLIPSEN 0.15-30 MG-MCG PO TABS, , Disp: , Rfl:      UNKNOWN MED DOSAGE, med for anxiety prn, Disp: , Rfl:      EPINEPHrine (AUVI-Q) 0.3 MG/0.3ML injection 2-pack, Inject 0.3 mLs (0.3 mg) into the muscle once as needed for anaphylaxis (Patient not taking: Reported on 11/22/2021), Disp: 2 each, Rfl: 2  No Known Allergies    EXAM:   BP (!)  146/91 (BP Location: Right arm, Patient Position: Sitting, Cuff Size: Adult Large)   Pulse 103   SpO2 98%   GENERAL APPEARANCE: alert, cooperative and not in distress  SKIN: no rashes, no lesions  HEAD: atraumatic, normocephalic  EYES: lids and lashes normal  ENT: no scars or lesions, tongue midline and normal, soft palate, uvula, and tonsils normal  NECK: no asymmetry, masses, or scars  LUNGS: unlabored respirations, no intercostal retractions or accessory muscle use, clear to auscultation without rales or wheezes  HEART: regular rate and rhythm without murmurs and normal S1 and S2  MUSCULOSKELETAL: no musculoskeletal defects are noted  NEURO: no focal deficits noted  PSYCH: does not appear depressed or anxious      WORKUP:  Cluster Immunotherapy    Cluster Allergen Immunotherapy:    After explaining risks and benefits, and obtaining verbal and written consent, we proceeded with cluster immunotherapy.     VISIT  VIAL COLOR/STRENGTH  DOSES TO BE GIVEN    1  GREEN (1:1000), BLUE (1:100)  GREEN 0.1, GREEN 0.2, GREEN 0.4, BLUE 0.1    2  BLUE (1:100), YELLOW (1:10)  BLUE 0.2, BLUE 0.4, YELLOW 0.05    3  YELLOW (1:10)  YELLOW 0.1, YELLOW 0.15, YELLOW 0.25    4  YELLOW (1:10)  YELLOW 0.35, YELLOW 0.5    5  RED (1:1)  RED 0.05, RED 0.1    6  RED (1:1)  RED 0.15, RED 0.2    7  RED (1:1)  RED 0.3, RED 0.4    8  RED (1:1)  RED 0.5        VISIT 3    Time Injection Given: 7:57  Yellow 1:10   Weeds    0.1 mL  Yellow 1:10   Molds    0.1 mL    Time Injection Given: 8:30  Yellow 1:10   Weeds    0.15 mL  Yellow 1:10   Molds    0.15 mL    Time Injection Given: 9:10  Yellow 1:10   Weeds    0.25 mL  Yellow 1:10   Molds    0.25 mL    Start Time: 7:57  End Time: 9:40        VITALS   Time BP Pulse pOx Reaction Treatment   8:27 135/74 103 97% none n/a   9:05 111/66 95 96% none n/a   9:40 118/79 1030 97% none n/a       ASSESSMENT/PLAN:  Pebbles Lowry is a 31 year old female here for cluster immunotherapy.    1. Seasonal allergic rhinitis  due to pollen - Pebbles tolerated today's procedure well without developing any signs or symptoms of an adverse reaction.     - return in 7-14 days to continue cluster protocol  - continue pre-medication with cetirizine as directed  - RAPID DESENSITIZATION    2. Allergic rhinitis due to mold    - RAPID DESENSITIZATION      Thank you for allowing me to participate in the care of Pebbles Lowyr.      Hemalatha Jimenes MD, [facility]  Allergy/Immunology  [facility]      Chart documentation done in part with Dragon Voice Recognition Software. Although reviewed after completion, some word and grammatical errors may remain.      Again, thank you for allowing me to participate in the care of your patient.        Sincerely,        Hemalatha Jimenes MD

## 2021-11-22 NOTE — PROGRESS NOTES
Pebbles Lowry was seen in the Allergy Clinic at [facility].      Pebbles Lowry is a 31 year old American female who is seen today for cluster immunotherapy. She had no adverse reactions after her last visit. She has been feeling well and has not had any recent fevers or illness. Pebbles pre-medicated with cetirizine as directed prior to today's visit.      History reviewed. No pertinent past medical history.  History reviewed. No pertinent family history.  Social History     Tobacco Use     Smoking status: Never Smoker     Smokeless tobacco: Never Used   Substance Use Topics     Alcohol use: None     Drug use: None     Social History     Social History Narrative     Not on file       Past medical, family, and social history were reviewed.    REVIEW OF SYSTEMS:  General: negative for weight gain. negative for weight loss. negative for changes in sleep.   Eyes: negative for itching. negative for redness. negative for tearing/watering. negative for vision changes  Ears: negative for fullness. negative for hearing loss. negative for dizziness.   Nose: negative for snoring.negative for changes in smell. negative for drainage.   Throat: negative for hoarseness. negative for sore throat. negative for trouble swallowing.   Lungs: negative for cough. negative for shortness of breath.negative for wheezing. negative for sputum production.   Cardiovascular: negative for chest pain. negative for swelling of ankles. negative for fast or irregular heartbeat.   Gastrointestinal: negative for nausea. negative for heartburn. negative for acid reflux.   Musculoskeletal: negative for joint pain. negative for joint stiffness. negative for joint swelling.   Neurologic: negative for seizures. negative for fainting. negative for weakness.   Psychiatric: negative for changes in mood. negative for anxiety.   Endocrine: negative for cold intolerance. negative for heat intolerance. negative for tremors.   Hematologic:  negative for easy bruising. negative for easy bleeding.  Integumentary: negative for rash. negative for scaling. negative for nail changes.       Current Outpatient Medications:      albuterol (PROAIR HFA/PROVENTIL HFA/VENTOLIN HFA) 108 (90 Base) MCG/ACT inhaler, Inhale 2 puffs into the lungs every 4 hours as needed, Disp: , Rfl:      cetirizine (ZYRTEC) 10 MG tablet, Take 10 mg by mouth daily , Disp: , Rfl:      Docosahexaenoic Acid (PRENATAL DHA) 200 MG capsule, Take 200 mg by mouth daily, Disp: , Rfl:      fluticasone (FLONASE) 50 MCG/ACT nasal spray, Spray 1 spray into both nostrils daily, Disp: , Rfl:      fluticasone-salmeterol (ADVAIR) 100-50 MCG/DOSE inhaler, Inhale 1 puff into the lungs every 12 hours, Disp: , Rfl:      montelukast (SINGULAIR) 10 MG tablet, 10 mg daily, Disp: , Rfl:      NABUMETONE 500 MG PO TABS, 1 TABLET TWICE DAILY, Disp: 60 Tab, Rfl: 0     olopatadine (PATADAY) 0.2 % ophthalmic solution, 1 drop daily, Disp: , Rfl:      ORDER FOR ALLERGEN IMMUNOTHERAPY, Name of Mix: Mix #1  Mold Alternaria Tenuis 1:10 w/v, HS  0.5 ml Epicoccum Nigrum 1:10 w/v, HS 0.5 ml Diluent: HSA qs to 5ml, Disp: 5 mL, Rfl: PRN     ORDER FOR ALLERGEN IMMUNOTHERAPY, Name of Mix: Mix #2  Weeds Ragweed Mixed 1:20 w/v ALK  0.5 ml Diluent: HSA qs to 5ml, Disp: 5 mL, Rfl: PRN     ORDER FOR DME, left elastic knee brace, Disp: 1 Device, Rfl: 0     RECLIPSEN 0.15-30 MG-MCG PO TABS, , Disp: , Rfl:      UNKNOWN MED DOSAGE, med for anxiety prn, Disp: , Rfl:      EPINEPHrine (AUVI-Q) 0.3 MG/0.3ML injection 2-pack, Inject 0.3 mLs (0.3 mg) into the muscle once as needed for anaphylaxis (Patient not taking: Reported on 11/22/2021), Disp: 2 each, Rfl: 2  No Known Allergies    EXAM:   BP (!) 146/91 (BP Location: Right arm, Patient Position: Sitting, Cuff Size: Adult Large)   Pulse 103   SpO2 98%   GENERAL APPEARANCE: alert, cooperative and not in distress  SKIN: no rashes, no lesions  HEAD: atraumatic, normocephalic  EYES: lids and  lashes normal  ENT: no scars or lesions, tongue midline and normal, soft palate, uvula, and tonsils normal  NECK: no asymmetry, masses, or scars  LUNGS: unlabored respirations, no intercostal retractions or accessory muscle use, clear to auscultation without rales or wheezes  HEART: regular rate and rhythm without murmurs and normal S1 and S2  MUSCULOSKELETAL: no musculoskeletal defects are noted  NEURO: no focal deficits noted  PSYCH: does not appear depressed or anxious      WORKUP:  Cluster Immunotherapy    Cluster Allergen Immunotherapy:    After explaining risks and benefits, and obtaining verbal and written consent, we proceeded with cluster immunotherapy.     VISIT  VIAL COLOR/STRENGTH  DOSES TO BE GIVEN    1  GREEN (1:1000), BLUE (1:100)  GREEN 0.1, GREEN 0.2, GREEN 0.4, BLUE 0.1    2  BLUE (1:100), YELLOW (1:10)  BLUE 0.2, BLUE 0.4, YELLOW 0.05    3  YELLOW (1:10)  YELLOW 0.1, YELLOW 0.15, YELLOW 0.25    4  YELLOW (1:10)  YELLOW 0.35, YELLOW 0.5    5  RED (1:1)  RED 0.05, RED 0.1    6  RED (1:1)  RED 0.15, RED 0.2    7  RED (1:1)  RED 0.3, RED 0.4    8  RED (1:1)  RED 0.5        VISIT 3    Time Injection Given: 7:57  Yellow 1:10   Weeds    0.1 mL  Yellow 1:10   Molds    0.1 mL    Time Injection Given: 8:30  Yellow 1:10   Weeds    0.15 mL  Yellow 1:10   Molds    0.15 mL    Time Injection Given: 9:10  Yellow 1:10   Weeds    0.25 mL  Yellow 1:10   Molds    0.25 mL    Start Time: 7:57  End Time: 9:40        VITALS   Time BP Pulse pOx Reaction Treatment   8:27 135/74 103 97% none n/a   9:05 111/66 95 96% none n/a   9:40 118/79 1030 97% none n/a       ASSESSMENT/PLAN:  Pebbles Lowry is a 31 year old female here for cluster immunotherapy.    1. Seasonal allergic rhinitis due to pollen - Pebbles tolerated today's procedure well without developing any signs or symptoms of an adverse reaction.     - return in 7-14 days to continue cluster protocol  - continue pre-medication with cetirizine as directed  - RAPID  DESENSITIZATION    2. Allergic rhinitis due to mold    - RAPID DESENSITIZATION      Thank you for allowing me to participate in the care of Pebbles Lowry.      Hemalatha Jimenes MD, FAAAAI  Allergy/Immunology  [facility]      Chart documentation done in part with Dragon Voice Recognition Software. Although reviewed after completion, some word and grammatical errors may remain.

## 2021-11-23 ASSESSMENT — ASTHMA QUESTIONNAIRES: ACT_TOTALSCORE: 24

## 2021-12-06 ENCOUNTER — OFFICE VISIT (OUTPATIENT)
Dept: ALLERGY | Facility: CLINIC | Age: 31
End: 2021-12-06
Payer: COMMERCIAL

## 2021-12-06 VITALS — SYSTOLIC BLOOD PRESSURE: 140 MMHG | DIASTOLIC BLOOD PRESSURE: 84 MMHG | HEART RATE: 91 BPM | OXYGEN SATURATION: 98 %

## 2021-12-06 DIAGNOSIS — J30.1 SEASONAL ALLERGIC RHINITIS DUE TO POLLEN: ICD-10-CM

## 2021-12-06 DIAGNOSIS — J30.89 ALLERGIC RHINITIS DUE TO MOLD: ICD-10-CM

## 2021-12-06 DIAGNOSIS — J30.1 SEASONAL ALLERGIC RHINITIS DUE TO POLLEN: Primary | ICD-10-CM

## 2021-12-06 PROCEDURE — 99207 PR DROP WITH A PROCEDURE: CPT | Performed by: ALLERGY & IMMUNOLOGY

## 2021-12-06 PROCEDURE — 95180 RAPID DESENSITIZATION: CPT | Performed by: ALLERGY & IMMUNOLOGY

## 2021-12-06 NOTE — LETTER
12/6/2021         RE: Pebbles Lowry  [address] [address]        Dear Colleague,    Thank you for referring your patient, Pebbles Lowry, to the [facility]. Please see a copy of my visit note below.    Pebbles Lowry was seen in the Allergy Clinic at [facility].      Pebbles Lowry is a 31 year old American female who is seen today for cluster immunotherapy. She had no adverse reactions after her last visit. She has been feeling well and has not had any recent fevers or illness. Pebbles pre-medicated with cetirizine as directed prior to today's visit.    History reviewed. No pertinent past medical history.  History reviewed. No pertinent family history.  Social History     Tobacco Use     Smoking status: Never Smoker     Smokeless tobacco: Never Used   Substance Use Topics     Alcohol use: None     Drug use: None     Social History     Social History Narrative     Not on file       Past medical, family, and social history were reviewed.    REVIEW OF SYSTEMS:  General: negative for weight gain. negative for weight loss. negative for changes in sleep.   Eyes: negative for itching. negative for redness. negative for tearing/watering. negative for vision changes  Ears: negative for fullness. negative for hearing loss. negative for dizziness.   Nose: negative for snoring.negative for changes in smell. negative for drainage.   Throat: negative for hoarseness. negative for sore throat. negative for trouble swallowing.   Lungs: negative for cough. negative for shortness of breath.negative for wheezing. negative for sputum production.   Cardiovascular: negative for chest pain. negative for swelling of ankles. negative for fast or irregular heartbeat.   Gastrointestinal: negative for nausea. negative for heartburn. negative for acid reflux.   Musculoskeletal: negative for joint pain. negative for joint stiffness. negative for joint swelling.   Neurologic:  negative for seizures. negative for fainting. negative for weakness.   Psychiatric: negative for changes in mood. negative for anxiety.   Endocrine: negative for cold intolerance. negative for heat intolerance. negative for tremors.   Hematologic: negative for easy bruising. negative for easy bleeding.  Integumentary: negative for rash. negative for scaling. negative for nail changes.       Current Outpatient Medications:      albuterol (PROAIR HFA/PROVENTIL HFA/VENTOLIN HFA) 108 (90 Base) MCG/ACT inhaler, Inhale 2 puffs into the lungs every 4 hours as needed, Disp: , Rfl:      cetirizine (ZYRTEC) 10 MG tablet, Take 10 mg by mouth daily , Disp: , Rfl:      Docosahexaenoic Acid (PRENATAL DHA) 200 MG capsule, Take 200 mg by mouth daily, Disp: , Rfl:      fluticasone (FLONASE) 50 MCG/ACT nasal spray, Spray 1 spray into both nostrils daily, Disp: , Rfl:      fluticasone-salmeterol (ADVAIR) 100-50 MCG/DOSE inhaler, Inhale 1 puff into the lungs every 12 hours, Disp: , Rfl:      montelukast (SINGULAIR) 10 MG tablet, 10 mg daily, Disp: , Rfl:      NABUMETONE 500 MG PO TABS, 1 TABLET TWICE DAILY, Disp: 60 Tab, Rfl: 0     olopatadine (PATADAY) 0.2 % ophthalmic solution, 1 drop daily, Disp: , Rfl:      ORDER FOR ALLERGEN IMMUNOTHERAPY, Name of Mix: Mix #1  Mold Alternaria Tenuis 1:10 w/v, HS  0.5 ml Epicoccum Nigrum 1:10 w/v, HS 0.5 ml Diluent: HSA qs to 5ml, Disp: 5 mL, Rfl: PRN     ORDER FOR ALLERGEN IMMUNOTHERAPY, Name of Mix: Mix #2  Weeds Ragweed Mixed 1:20 w/v ALK  0.5 ml Diluent: HSA qs to 5ml, Disp: 5 mL, Rfl: PRN     ORDER FOR DME, left elastic knee brace, Disp: 1 Device, Rfl: 0     RECLIPSEN 0.15-30 MG-MCG PO TABS, , Disp: , Rfl:      UNKNOWN MED DOSAGE, med for anxiety prn, Disp: , Rfl:      EPINEPHrine (AUVI-Q) 0.3 MG/0.3ML injection 2-pack, Inject 0.3 mLs (0.3 mg) into the muscle once as needed for anaphylaxis (Patient not taking: Reported on 11/22/2021), Disp: 2 each, Rfl: 2  No Known Allergies    EXAM:   BP (!)  140/84 (BP Location: Right arm, Patient Position: Sitting, Cuff Size: Adult Large)   Pulse 91   SpO2 98%   GENERAL APPEARANCE: alert, cooperative and not in distress  SKIN: no rashes, no lesions  HEAD: atraumatic, normocephalic  EYES: lids and lashes normal  ENT: no scars or lesions, tongue midline and normal, soft palate, uvula, and tonsils normal  NECK: no asymmetry, masses, or scars  LUNGS: unlabored respirations, no intercostal retractions or accessory muscle use, clear to auscultation without rales or wheezes  HEART: regular rate and rhythm without murmurs and normal S1 and S2  MUSCULOSKELETAL: no musculoskeletal defects are noted  NEURO: no focal deficits noted  PSYCH: does not appear depressed or anxious      WORKUP:  Cluster Immunotherapy  Cluster Allergen Immunotherapy:    After explaining risks and benefits, and obtaining verbal and written consent, we proceeded with cluster immunotherapy.     VISIT  VIAL COLOR/STRENGTH  DOSES TO BE GIVEN    1  GREEN (1:1000), BLUE (1:100)  GREEN 0.1, GREEN 0.2, GREEN 0.4, BLUE 0.1    2  BLUE (1:100), YELLOW (1:10)  BLUE 0.2, BLUE 0.4, YELLOW 0.05    3  YELLOW (1:10)  YELLOW 0.1, YELLOW 0.15, YELLOW 0.25    4  YELLOW (1:10)  YELLOW 0.35, YELLOW 0.5    5  RED (1:1)  RED 0.05, RED 0.1    6  RED (1:1)  RED 0.15, RED 0.2    7  RED (1:1)  RED 0.3, RED 0.4    8  RED (1:1)  RED 0.5        VISIT 4    Time Injection Given: 8:00  Yellow 1:10   Weeds     0.35 mL  Yellow 1:10   Molds     0.35 mL      Time Injection Given: 8:37  Yellow 1:10   Weeds     0.5 mL  Yellow 1:10   Molds     0.5 mL      Start Time: 8:00  End Time: 9:07      VITALS   Time BP Pulse pOx Reaction Treatment   8:35 117/82 92 96% none n/a   9:07 125/83 90 96% noen n/a       ASSESSMENT/PLAN:  Pebbles Lowry is a 31 year old female here for cluster immunotherapy.    1. Seasonal allergic rhinitis due to pollen - Pebbles tolerated today's procedure well without developing any signs or symptoms of an adverse  reaction.     - return in 7-14 days to continue cluster protocol  - continue pre-medication with cetirizine as directed  - RAPID DESENSITIZATION    2. Allergic rhinitis due to mold    - RAPID DESENSITIZATION      Thank you for allowing me to participate in the care of Pebbles Lowry.      Hemalatha Jimenes MD, FAAAAI  Allergy/Immunology  [facility]      Chart documentation done in part with Dragon Voice Recognition Software. Although reviewed after completion, some word and grammatical errors may remain.    Prior to initiation of cluster immunotherapy RN ensured that patient was feeling healthy, has premedicated with Zyrtec or Allegra yesterday twice daily and this morning. RN also ensured that patient has unexpired Epi-Pen, had no new medication changes, and did not have a reaction after the last allergy shots were given. If the patient has asthma it is well-controlled. The patient has not been ill in the past 7 days. Patient was given allergy injections per cluster immunotherapy protocol 30 minutes apart and vital signs were monitored. Patient was monitored in clinic for 30 minutes after last injection was given and assessed by provider before discharging.     Mary Beth Ortega, RN      Again, thank you for allowing me to participate in the care of your patient.        Sincerely,        Hemalatha Jimenes MD

## 2021-12-06 NOTE — PROGRESS NOTES
Pebbles Lowry was seen in the Allergy Clinic at [facility].      Pebbles Lowry is a 31 year old American female who is seen today for cluster immunotherapy. She had no adverse reactions after her last visit. She has been feeling well and has not had any recent fevers or illness. Pebbles pre-medicated with cetirizine as directed prior to today's visit.    History reviewed. No pertinent past medical history.  History reviewed. No pertinent family history.  Social History     Tobacco Use     Smoking status: Never Smoker     Smokeless tobacco: Never Used   Substance Use Topics     Alcohol use: None     Drug use: None     Social History     Social History Narrative     Not on file       Past medical, family, and social history were reviewed.    REVIEW OF SYSTEMS:  General: negative for weight gain. negative for weight loss. negative for changes in sleep.   Eyes: negative for itching. negative for redness. negative for tearing/watering. negative for vision changes  Ears: negative for fullness. negative for hearing loss. negative for dizziness.   Nose: negative for snoring.negative for changes in smell. negative for drainage.   Throat: negative for hoarseness. negative for sore throat. negative for trouble swallowing.   Lungs: negative for cough. negative for shortness of breath.negative for wheezing. negative for sputum production.   Cardiovascular: negative for chest pain. negative for swelling of ankles. negative for fast or irregular heartbeat.   Gastrointestinal: negative for nausea. negative for heartburn. negative for acid reflux.   Musculoskeletal: negative for joint pain. negative for joint stiffness. negative for joint swelling.   Neurologic: negative for seizures. negative for fainting. negative for weakness.   Psychiatric: negative for changes in mood. negative for anxiety.   Endocrine: negative for cold intolerance. negative for heat intolerance. negative for tremors.   Hematologic:  negative for easy bruising. negative for easy bleeding.  Integumentary: negative for rash. negative for scaling. negative for nail changes.       Current Outpatient Medications:      albuterol (PROAIR HFA/PROVENTIL HFA/VENTOLIN HFA) 108 (90 Base) MCG/ACT inhaler, Inhale 2 puffs into the lungs every 4 hours as needed, Disp: , Rfl:      cetirizine (ZYRTEC) 10 MG tablet, Take 10 mg by mouth daily , Disp: , Rfl:      Docosahexaenoic Acid (PRENATAL DHA) 200 MG capsule, Take 200 mg by mouth daily, Disp: , Rfl:      fluticasone (FLONASE) 50 MCG/ACT nasal spray, Spray 1 spray into both nostrils daily, Disp: , Rfl:      fluticasone-salmeterol (ADVAIR) 100-50 MCG/DOSE inhaler, Inhale 1 puff into the lungs every 12 hours, Disp: , Rfl:      montelukast (SINGULAIR) 10 MG tablet, 10 mg daily, Disp: , Rfl:      NABUMETONE 500 MG PO TABS, 1 TABLET TWICE DAILY, Disp: 60 Tab, Rfl: 0     olopatadine (PATADAY) 0.2 % ophthalmic solution, 1 drop daily, Disp: , Rfl:      ORDER FOR ALLERGEN IMMUNOTHERAPY, Name of Mix: Mix #1  Mold Alternaria Tenuis 1:10 w/v, HS  0.5 ml Epicoccum Nigrum 1:10 w/v, HS 0.5 ml Diluent: HSA qs to 5ml, Disp: 5 mL, Rfl: PRN     ORDER FOR ALLERGEN IMMUNOTHERAPY, Name of Mix: Mix #2  Weeds Ragweed Mixed 1:20 w/v ALK  0.5 ml Diluent: HSA qs to 5ml, Disp: 5 mL, Rfl: PRN     ORDER FOR DME, left elastic knee brace, Disp: 1 Device, Rfl: 0     RECLIPSEN 0.15-30 MG-MCG PO TABS, , Disp: , Rfl:      UNKNOWN MED DOSAGE, med for anxiety prn, Disp: , Rfl:      EPINEPHrine (AUVI-Q) 0.3 MG/0.3ML injection 2-pack, Inject 0.3 mLs (0.3 mg) into the muscle once as needed for anaphylaxis (Patient not taking: Reported on 11/22/2021), Disp: 2 each, Rfl: 2  No Known Allergies    EXAM:   BP (!) 140/84 (BP Location: Right arm, Patient Position: Sitting, Cuff Size: Adult Large)   Pulse 91   SpO2 98%   GENERAL APPEARANCE: alert, cooperative and not in distress  SKIN: no rashes, no lesions  HEAD: atraumatic, normocephalic  EYES: lids and  lashes normal  ENT: no scars or lesions, tongue midline and normal, soft palate, uvula, and tonsils normal  NECK: no asymmetry, masses, or scars  LUNGS: unlabored respirations, no intercostal retractions or accessory muscle use, clear to auscultation without rales or wheezes  HEART: regular rate and rhythm without murmurs and normal S1 and S2  MUSCULOSKELETAL: no musculoskeletal defects are noted  NEURO: no focal deficits noted  PSYCH: does not appear depressed or anxious      WORKUP:  Cluster Immunotherapy  Cluster Allergen Immunotherapy:    After explaining risks and benefits, and obtaining verbal and written consent, we proceeded with cluster immunotherapy.     VISIT  VIAL COLOR/STRENGTH  DOSES TO BE GIVEN    1  GREEN (1:1000), BLUE (1:100)  GREEN 0.1, GREEN 0.2, GREEN 0.4, BLUE 0.1    2  BLUE (1:100), YELLOW (1:10)  BLUE 0.2, BLUE 0.4, YELLOW 0.05    3  YELLOW (1:10)  YELLOW 0.1, YELLOW 0.15, YELLOW 0.25    4  YELLOW (1:10)  YELLOW 0.35, YELLOW 0.5    5  RED (1:1)  RED 0.05, RED 0.1    6  RED (1:1)  RED 0.15, RED 0.2    7  RED (1:1)  RED 0.3, RED 0.4    8  RED (1:1)  RED 0.5        VISIT 4    Time Injection Given: 8:00  Yellow 1:10   Weeds     0.35 mL  Yellow 1:10   Molds     0.35 mL      Time Injection Given: 8:37  Yellow 1:10   Weeds     0.5 mL  Yellow 1:10   Molds     0.5 mL      Start Time: 8:00  End Time: 9:07      VITALS   Time BP Pulse pOx Reaction Treatment   8:35 117/82 92 96% none n/a   9:07 125/83 90 96% noen n/a       ASSESSMENT/PLAN:  Pebbles Lowry is a 31 year old female here for cluster immunotherapy.    1. Seasonal allergic rhinitis due to pollen - Pebbles tolerated today's procedure well without developing any signs or symptoms of an adverse reaction.     - return in 7-14 days to continue cluster protocol  - continue pre-medication with cetirizine as directed  - RAPID DESENSITIZATION    2. Allergic rhinitis due to mold    - RAPID DESENSITIZATION      Thank you for allowing me to participate in  the care of Pebbles Lowry.      Hemalatha Jimenes MD, FAAAAI  Allergy/Immunology  [facility]      Chart documentation done in part with Dragon Voice Recognition Software. Although reviewed after completion, some word and grammatical errors may remain.

## 2021-12-06 NOTE — TELEPHONE ENCOUNTER
ALLERGY SOLUTION RE-ORDER REQUEST    Pebbles Lowry 1990 MRN: [id number]    DATE NEEDED:  12/27/21  Vial Color Content    Vial Size  Red 1:1 Weeds    5 mL  Red 1:1 Molds   5 mL      Serum reorder consent signed and patient/parent was advised that new serums would be ordered through the pharmacy and billed to their insurance company when they arrive in clinic. Yes    Shot Clinic Location:  [place]  Ship to Location: [place]  Serum billed to:  [place]    Special Instructions:  Patient would like serums billed before end of year if possible.        Requester Signature  Mary Beth Ortega RN

## 2021-12-10 DIAGNOSIS — J30.89 ALLERGIC RHINITIS DUE TO MOLD: Primary | ICD-10-CM

## 2021-12-10 DIAGNOSIS — J30.1 SEASONAL ALLERGIC RHINITIS DUE TO POLLEN: ICD-10-CM

## 2021-12-10 PROCEDURE — 95165 ANTIGEN THERAPY SERVICES: CPT | Performed by: ALLERGY & IMMUNOLOGY

## 2021-12-10 NOTE — PROGRESS NOTES
Allergy serums billed to [facility].     Vials billed below:    Vial Color Content                      Vial Size Expiration Date  Red 1:1                                   Weeds                                5 mL 12/10/2022  Red 1:1                                   Molds                                 5 mL 12/10/2022    Checked by Mohini GROSS  20 units billed     Signature  Lizeth Gutierrez RN

## 2021-12-13 ENCOUNTER — OFFICE VISIT (OUTPATIENT)
Dept: ALLERGY | Facility: CLINIC | Age: 31
End: 2021-12-13
Payer: COMMERCIAL

## 2021-12-13 VITALS — OXYGEN SATURATION: 98 % | SYSTOLIC BLOOD PRESSURE: 135 MMHG | DIASTOLIC BLOOD PRESSURE: 90 MMHG | HEART RATE: 98 BPM

## 2021-12-13 DIAGNOSIS — J30.89 ALLERGIC RHINITIS DUE TO MOLD: ICD-10-CM

## 2021-12-13 DIAGNOSIS — J30.1 SEASONAL ALLERGIC RHINITIS DUE TO POLLEN: Primary | ICD-10-CM

## 2021-12-13 PROCEDURE — 99207 PR DROP WITH A PROCEDURE: CPT | Performed by: ALLERGY & IMMUNOLOGY

## 2021-12-13 PROCEDURE — 95180 RAPID DESENSITIZATION: CPT | Performed by: ALLERGY & IMMUNOLOGY

## 2021-12-13 NOTE — PROGRESS NOTES
Pebbles Lowry was seen in the Allergy Clinic at [facility].      Pebbles Lowry is a 31 year old American female who is seen today for cluster immunotherapy. She had no adverse reactions after her last visit. She has been feeling well and has not had any recent fevers or illness. Pebbles pre-medicated with cetirizine as directed prior to today's visit.    History reviewed. No pertinent past medical history.  History reviewed. No pertinent family history.  Social History     Tobacco Use     Smoking status: Never Smoker     Smokeless tobacco: Never Used   Substance Use Topics     Alcohol use: None     Drug use: None     Social History     Social History Narrative     Not on file       Past medical, family, and social history were reviewed.    REVIEW OF SYSTEMS:  General: negative for weight gain. negative for weight loss. negative for changes in sleep.   Eyes: negative for itching. negative for redness. negative for tearing/watering. negative for vision changes  Ears: negative for fullness. negative for hearing loss. negative for dizziness.   Nose: negative for snoring.negative for changes in smell. negative for drainage.   Throat: negative for hoarseness. negative for sore throat. negative for trouble swallowing.   Lungs: negative for cough. negative for shortness of breath.negative for wheezing. negative for sputum production.   Cardiovascular: negative for chest pain. negative for swelling of ankles. negative for fast or irregular heartbeat.   Gastrointestinal: negative for nausea. negative for heartburn. negative for acid reflux.   Musculoskeletal: negative for joint pain. negative for joint stiffness. negative for joint swelling.   Neurologic: negative for seizures. negative for fainting. negative for weakness.   Psychiatric: negative for changes in mood. negative for anxiety.   Endocrine: negative for cold intolerance. negative for heat intolerance. negative for tremors.   Hematologic:  negative for easy bruising. negative for easy bleeding.  Integumentary: negative for rash. negative for scaling. negative for nail changes.       Current Outpatient Medications:      albuterol (PROAIR HFA/PROVENTIL HFA/VENTOLIN HFA) 108 (90 Base) MCG/ACT inhaler, Inhale 2 puffs into the lungs every 4 hours as needed, Disp: , Rfl:      cetirizine (ZYRTEC) 10 MG tablet, Take 10 mg by mouth daily , Disp: , Rfl:      Docosahexaenoic Acid (PRENATAL DHA) 200 MG capsule, Take 200 mg by mouth daily, Disp: , Rfl:      fluticasone (FLONASE) 50 MCG/ACT nasal spray, Spray 1 spray into both nostrils daily, Disp: , Rfl:      fluticasone-salmeterol (ADVAIR) 100-50 MCG/DOSE inhaler, Inhale 1 puff into the lungs every 12 hours, Disp: , Rfl:      montelukast (SINGULAIR) 10 MG tablet, 10 mg daily, Disp: , Rfl:      NABUMETONE 500 MG PO TABS, 1 TABLET TWICE DAILY, Disp: 60 Tab, Rfl: 0     olopatadine (PATADAY) 0.2 % ophthalmic solution, 1 drop daily, Disp: , Rfl:      ORDER FOR ALLERGEN IMMUNOTHERAPY, Name of Mix: Mix #1  Mold Alternaria Tenuis 1:10 w/v, HS  0.5 ml Epicoccum Nigrum 1:10 w/v, HS 0.5 ml Diluent: HSA qs to 5ml, Disp: 5 mL, Rfl: PRN     ORDER FOR ALLERGEN IMMUNOTHERAPY, Name of Mix: Mix #2  Weeds Ragweed Mixed 1:20 w/v ALK  0.5 ml Diluent: HSA qs to 5ml, Disp: 5 mL, Rfl: PRN     ORDER FOR DME, left elastic knee brace, Disp: 1 Device, Rfl: 0     RECLIPSEN 0.15-30 MG-MCG PO TABS, , Disp: , Rfl:      UNKNOWN MED DOSAGE, med for anxiety prn, Disp: , Rfl:      EPINEPHrine (AUVI-Q) 0.3 MG/0.3ML injection 2-pack, Inject 0.3 mLs (0.3 mg) into the muscle once as needed for anaphylaxis (Patient not taking: Reported on 11/22/2021), Disp: 2 each, Rfl: 2  No Known Allergies    EXAM:   BP (!) 135/90 (BP Location: Right arm, Patient Position: Sitting, Cuff Size: Adult Large)   Pulse 98   SpO2 98%   GENERAL APPEARANCE: alert, cooperative and not in distress  SKIN: no rashes, no lesions  HEAD: atraumatic, normocephalic  EYES: lids and  lashes normal  ENT: no scars or lesions, tongue midline and normal, soft palate, uvula, and tonsils normal  NECK: no asymmetry, masses, or scars  LUNGS: unlabored respirations, no intercostal retractions or accessory muscle use, clear to auscultation without rales or wheezes  HEART: regular rate and rhythm without murmurs and normal S1 and S2  MUSCULOSKELETAL: no musculoskeletal defects are noted  NEURO: no focal deficits noted  PSYCH: does not appear depressed or anxious      WORKUP:  Cluster Immunotherapy  Cluster Allergen Immunotherapy:    After explaining risks and benefits, and obtaining verbal and written consent, we proceeded with cluster immunotherapy.     VISIT  VIAL COLOR/STRENGTH  DOSES TO BE GIVEN    1  GREEN (1:1000), BLUE (1:100)  GREEN 0.1, GREEN 0.2, GREEN 0.4, BLUE 0.1    2  BLUE (1:100), YELLOW (1:10)  BLUE 0.2, BLUE 0.4, YELLOW 0.05    3  YELLOW (1:10)  YELLOW 0.1, YELLOW 0.15, YELLOW 0.25    4  YELLOW (1:10)  YELLOW 0.35, YELLOW 0.5    5  RED (1:1)  RED 0.05, RED 0.1    6  RED (1:1)  RED 0.15, RED 0.2    7  RED (1:1)  RED 0.3, RED 0.4    8  RED (1:1)  RED 0.5        VISIT 5    Time Injection Given: 8:07  Red 1:1   Weeds    0.05 mL  Red 1:1   Molds    0.05 mL    Time Injection Given: 8:40  Red 1:1   Weeds    0.1 mL  Red 1:1   Molds    0.1 mL      Start Time: 8:07  End Time: 9:10      VITALS   Time BP Pulse pOx Reaction Treatment   8:37 122/86 90 97% none n/a   9:10 124/87 98 97% none n/a       ASSESSMENT/PLAN:  Pebbles Lowry is a 31 year old female here for cluster immunotherapy.    1. Seasonal allergic rhinitis due to pollen - Pebbles tolerated today's procedure well without developing any signs or symptoms of an adverse reaction.     - return in 7-14 days to continue cluster protocol  - continue pre-medication with cetirizine as directed  - OK to build in 0.1mL increments in the red vial as long as she has followed the pre-medication regimen  - RAPID DESENSITIZATION    2. Allergic rhinitis due to  mold    - RAPID DESENSITIZATION      Thank you for allowing me to participate in the care of Pebbles Lowry.      Hemalatha Jimenes MD, FAAAAI  Allergy/Immunology  [facility]      Chart documentation done in part with Dragon Voice Recognition Software. Although reviewed after completion, some word and grammatical errors may remain.

## 2021-12-13 NOTE — LETTER
12/13/2021         RE: Pebbles Lowry  [address] [place] [address]        Dear Colleague,    Thank you for referring your patient, Pebbles Lowry, to the [facility]. Please see a copy of my visit note below.    Pebbles Lowry was seen in the Allergy Clinic at [facility].      Pebbles Lowry is a 31 year old American female who is seen today for cluster immunotherapy. She had no adverse reactions after her last visit. She has been feeling well and has not had any recent fevers or illness. Pebbles pre-medicated with cetirizine as directed prior to today's visit.    History reviewed. No pertinent past medical history.  History reviewed. No pertinent family history.  Social History     Tobacco Use     Smoking status: Never Smoker     Smokeless tobacco: Never Used   Substance Use Topics     Alcohol use: None     Drug use: None     Social History     Social History Narrative     Not on file       Past medical, family, and social history were reviewed.    REVIEW OF SYSTEMS:  General: negative for weight gain. negative for weight loss. negative for changes in sleep.   Eyes: negative for itching. negative for redness. negative for tearing/watering. negative for vision changes  Ears: negative for fullness. negative for hearing loss. negative for dizziness.   Nose: negative for snoring.negative for changes in smell. negative for drainage.   Throat: negative for hoarseness. negative for sore throat. negative for trouble swallowing.   Lungs: negative for cough. negative for shortness of breath.negative for wheezing. negative for sputum production.   Cardiovascular: negative for chest pain. negative for swelling of ankles. negative for fast or irregular heartbeat.   Gastrointestinal: negative for nausea. negative for heartburn. negative for acid reflux.   Musculoskeletal: negative for joint pain. negative for joint stiffness. negative for joint swelling.   Neurologic:  negative for seizures. negative for fainting. negative for weakness.   Psychiatric: negative for changes in mood. negative for anxiety.   Endocrine: negative for cold intolerance. negative for heat intolerance. negative for tremors.   Hematologic: negative for easy bruising. negative for easy bleeding.  Integumentary: negative for rash. negative for scaling. negative for nail changes.       Current Outpatient Medications:      albuterol (PROAIR HFA/PROVENTIL HFA/VENTOLIN HFA) 108 (90 Base) MCG/ACT inhaler, Inhale 2 puffs into the lungs every 4 hours as needed, Disp: , Rfl:      cetirizine (ZYRTEC) 10 MG tablet, Take 10 mg by mouth daily , Disp: , Rfl:      Docosahexaenoic Acid (PRENATAL DHA) 200 MG capsule, Take 200 mg by mouth daily, Disp: , Rfl:      fluticasone (FLONASE) 50 MCG/ACT nasal spray, Spray 1 spray into both nostrils daily, Disp: , Rfl:      fluticasone-salmeterol (ADVAIR) 100-50 MCG/DOSE inhaler, Inhale 1 puff into the lungs every 12 hours, Disp: , Rfl:      montelukast (SINGULAIR) 10 MG tablet, 10 mg daily, Disp: , Rfl:      NABUMETONE 500 MG PO TABS, 1 TABLET TWICE DAILY, Disp: 60 Tab, Rfl: 0     olopatadine (PATADAY) 0.2 % ophthalmic solution, 1 drop daily, Disp: , Rfl:      ORDER FOR ALLERGEN IMMUNOTHERAPY, Name of Mix: Mix #1  Mold Alternaria Tenuis 1:10 w/v, HS  0.5 ml Epicoccum Nigrum 1:10 w/v, HS 0.5 ml Diluent: HSA qs to 5ml, Disp: 5 mL, Rfl: PRN     ORDER FOR ALLERGEN IMMUNOTHERAPY, Name of Mix: Mix #2  Weeds Ragweed Mixed 1:20 w/v ALK  0.5 ml Diluent: HSA qs to 5ml, Disp: 5 mL, Rfl: PRN     ORDER FOR DME, left elastic knee brace, Disp: 1 Device, Rfl: 0     RECLIPSEN 0.15-30 MG-MCG PO TABS, , Disp: , Rfl:      UNKNOWN MED DOSAGE, med for anxiety prn, Disp: , Rfl:      EPINEPHrine (AUVI-Q) 0.3 MG/0.3ML injection 2-pack, Inject 0.3 mLs (0.3 mg) into the muscle once as needed for anaphylaxis (Patient not taking: Reported on 11/22/2021), Disp: 2 each, Rfl: 2  No Known Allergies    EXAM:   BP (!)  135/90 (BP Location: Right arm, Patient Position: Sitting, Cuff Size: Adult Large)   Pulse 98   SpO2 98%   GENERAL APPEARANCE: alert, cooperative and not in distress  SKIN: no rashes, no lesions  HEAD: atraumatic, normocephalic  EYES: lids and lashes normal  ENT: no scars or lesions, tongue midline and normal, soft palate, uvula, and tonsils normal  NECK: no asymmetry, masses, or scars  LUNGS: unlabored respirations, no intercostal retractions or accessory muscle use, clear to auscultation without rales or wheezes  HEART: regular rate and rhythm without murmurs and normal S1 and S2  MUSCULOSKELETAL: no musculoskeletal defects are noted  NEURO: no focal deficits noted  PSYCH: does not appear depressed or anxious      WORKUP:  Cluster Immunotherapy  Cluster Allergen Immunotherapy:    After explaining risks and benefits, and obtaining verbal and written consent, we proceeded with cluster immunotherapy.     VISIT  VIAL COLOR/STRENGTH  DOSES TO BE GIVEN    1  GREEN (1:1000), BLUE (1:100)  GREEN 0.1, GREEN 0.2, GREEN 0.4, BLUE 0.1    2  BLUE (1:100), YELLOW (1:10)  BLUE 0.2, BLUE 0.4, YELLOW 0.05    3  YELLOW (1:10)  YELLOW 0.1, YELLOW 0.15, YELLOW 0.25    4  YELLOW (1:10)  YELLOW 0.35, YELLOW 0.5    5  RED (1:1)  RED 0.05, RED 0.1    6  RED (1:1)  RED 0.15, RED 0.2    7  RED (1:1)  RED 0.3, RED 0.4    8  RED (1:1)  RED 0.5        VISIT 5    Time Injection Given: 8:07  Red 1:1   Weeds    0.05 mL  Red 1:1   Molds    0.05 mL    Time Injection Given: 8:40  Red 1:1   Weeds    0.1 mL  Red 1:1   Molds    0.1 mL      Start Time: 8:07  End Time: 9:10      VITALS   Time BP Pulse pOx Reaction Treatment   8:37 122/86 90 97% none n/a   9:10 124/87 98 97% none n/a       ASSESSMENT/PLAN:  Pebbles Lowry is a 31 year old female here for cluster immunotherapy.    1. Seasonal allergic rhinitis due to pollen - Pebbles tolerated today's procedure well without developing any signs or symptoms of an adverse reaction.     - return in 7-14 days  to continue cluster protocol  - continue pre-medication with cetirizine as directed  - OK to build in 0.1mL increments in the red vial as long as she has followed the pre-medication regimen  - RAPID DESENSITIZATION    2. Allergic rhinitis due to mold    - RAPID DESENSITIZATION      Thank you for allowing me to participate in the care of Pebbles Lowry.      Hemalatha Jimenes MD, FAAAAI  Allergy/Immunology  [facility]      Chart documentation done in part with Dragon Voice Recognition Software. Although reviewed after completion, some word and grammatical errors may remain.    Prior to initiation of cluster immunotherapy RN ensured that patient was feeling healthy, has premedicated with Zyrtec or Allegra yesterday twice daily and this morning. RN also ensured that patient has unexpired Epi-Pen, had no new medication changes, and did not have a reaction after the last allergy shots were given. If the patient has asthma it is well-controlled. The patient has not been ill in the past 7 days. Patient was given allergy injections per cluster immunotherapy protocol 30 minutes apart and vital signs were monitored. Patient was monitored in clinic for 30 minutes after last injection was given and assessed by provider before discharging.     Mary Beth Ortega RN      Again, thank you for allowing me to participate in the care of your patient.        Sincerely,        Hemalatha Jimenes MD

## 2021-12-14 ENCOUNTER — TELEPHONE (OUTPATIENT)
Dept: ALLERGY | Facility: CLINIC | Age: 31
End: 2021-12-14
Payer: COMMERCIAL

## 2021-12-14 NOTE — TELEPHONE ENCOUNTER
Sent via [employer]  12-14-21.  Scheduled  time 4:00pm    Your Job ID is: [id number]    Patient has an appointment on 12-21-21 for allergy shots.    Roslyn ARIAS RN

## 2021-12-14 NOTE — TELEPHONE ENCOUNTER
Pts red vials were received in [place], but could not be documented because the last name is misspelled. Please correct and send back to [place].    Danielle MASCORRO MA

## 2021-12-14 NOTE — TELEPHONE ENCOUNTER
Received pt serum via [employer]. Corrected the last name on the label to Alen.  Will mail serum back to [place] with next Shipment.  Jamie Moy / RAQUEL

## 2021-12-21 ENCOUNTER — ALLIED HEALTH/NURSE VISIT (OUTPATIENT)
Dept: ALLERGY | Facility: CLINIC | Age: 31
End: 2021-12-21
Payer: COMMERCIAL

## 2021-12-21 DIAGNOSIS — J30.1 SEASONAL ALLERGIC RHINITIS DUE TO POLLEN: Primary | ICD-10-CM

## 2021-12-21 PROCEDURE — 95117 IMMUNOTHERAPY INJECTIONS: CPT

## 2021-12-21 NOTE — TELEPHONE ENCOUNTER
Allergy serums received at [place].     Vials received below:    Vial Color Content                      Vial Size Expiration Date  Red 1:1 Molds 5mL  12/10/2022  Red 1:1 Weeds 5mL  12/10/2022    Signature  Roslyn Cash RN

## 2021-12-27 ENCOUNTER — ALLIED HEALTH/NURSE VISIT (OUTPATIENT)
Dept: ALLERGY | Facility: CLINIC | Age: 31
End: 2021-12-27
Payer: COMMERCIAL

## 2021-12-27 DIAGNOSIS — J30.1 SEASONAL ALLERGIC RHINITIS DUE TO POLLEN: Primary | ICD-10-CM

## 2021-12-27 PROCEDURE — 99207 PR DROP WITH A PROCEDURE: CPT

## 2021-12-27 PROCEDURE — 95117 IMMUNOTHERAPY INJECTIONS: CPT

## 2022-01-03 ENCOUNTER — OFFICE VISIT (OUTPATIENT)
Dept: ALLERGY | Facility: CLINIC | Age: 32
End: 2022-01-03
Payer: COMMERCIAL

## 2022-01-03 VITALS — HEART RATE: 97 BPM | SYSTOLIC BLOOD PRESSURE: 138 MMHG | DIASTOLIC BLOOD PRESSURE: 93 MMHG | OXYGEN SATURATION: 99 %

## 2022-01-03 DIAGNOSIS — J30.1 SEASONAL ALLERGIC RHINITIS DUE TO POLLEN: Primary | ICD-10-CM

## 2022-01-03 DIAGNOSIS — J30.89 ALLERGIC RHINITIS DUE TO MOLD: ICD-10-CM

## 2022-01-03 PROCEDURE — 95180 RAPID DESENSITIZATION: CPT | Performed by: ALLERGY & IMMUNOLOGY

## 2022-01-03 PROCEDURE — 99207 PR DROP WITH A PROCEDURE: CPT | Performed by: ALLERGY & IMMUNOLOGY

## 2022-01-03 NOTE — LETTER
1/3/2022         RE: Pebbles Lowry  [address] [place] [address]        Dear Colleague,    Thank you for referring your patient, Pebbles Lowry, to the [facility]. Please see a copy of my visit note below.    Pebbles Lowry was seen in the Allergy Clinic at [facility].      Pebbles Lowry is a 31 year old American female who is seen today for cluster immunotherapy. She had no adverse reactions after her last visit. She has been feeling well and has not had any recent fevers or illness. Pebbles pre-medicated with cetirizine as directed prior to today's visit.    History reviewed. No pertinent past medical history.  History reviewed. No pertinent family history.  Social History     Tobacco Use     Smoking status: Never Smoker     Smokeless tobacco: Never Used   Substance Use Topics     Alcohol use: None     Drug use: None     Social History     Social History Narrative     Not on file       Past medical, family, and social history were reviewed.    REVIEW OF SYSTEMS:  General: negative for weight gain. negative for weight loss. negative for changes in sleep.   Eyes: negative for itching. negative for redness. negative for tearing/watering. negative for vision changes  Ears: negative for fullness. negative for hearing loss. negative for dizziness.   Nose: negative for snoring.negative for changes in smell. negative for drainage.   Throat: negative for hoarseness. negative for sore throat. negative for trouble swallowing.   Lungs: negative for cough. negative for shortness of breath.negative for wheezing. negative for sputum production.   Cardiovascular: negative for chest pain. negative for swelling of ankles. negative for fast or irregular heartbeat.   Gastrointestinal: negative for nausea. negative for heartburn. negative for acid reflux.   Musculoskeletal: negative for joint pain. negative for joint stiffness. negative for joint swelling.   Neurologic: negative  for seizures. negative for fainting. negative for weakness.   Psychiatric: negative for changes in mood. negative for anxiety.   Endocrine: negative for cold intolerance. negative for heat intolerance. negative for tremors.   Hematologic: negative for easy bruising. negative for easy bleeding.  Integumentary: negative for rash. negative for scaling. negative for nail changes.       Current Outpatient Medications:      albuterol (PROAIR HFA/PROVENTIL HFA/VENTOLIN HFA) 108 (90 Base) MCG/ACT inhaler, Inhale 2 puffs into the lungs every 4 hours as needed, Disp: , Rfl:      cetirizine (ZYRTEC) 10 MG tablet, Take 10 mg by mouth daily , Disp: , Rfl:      Docosahexaenoic Acid (PRENATAL DHA) 200 MG capsule, Take 200 mg by mouth daily, Disp: , Rfl:      fluticasone (FLONASE) 50 MCG/ACT nasal spray, Spray 1 spray into both nostrils daily, Disp: , Rfl:      fluticasone-salmeterol (ADVAIR) 100-50 MCG/DOSE inhaler, Inhale 1 puff into the lungs every 12 hours, Disp: , Rfl:      montelukast (SINGULAIR) 10 MG tablet, 10 mg daily, Disp: , Rfl:      NABUMETONE 500 MG PO TABS, 1 TABLET TWICE DAILY, Disp: 60 Tab, Rfl: 0     olopatadine (PATADAY) 0.2 % ophthalmic solution, 1 drop daily, Disp: , Rfl:      ORDER FOR ALLERGEN IMMUNOTHERAPY, Name of Mix: Mix #1  Mold Alternaria Tenuis 1:10 w/v, HS  0.5 ml Epicoccum Nigrum 1:10 w/v, HS 0.5 ml Diluent: HSA qs to 5ml, Disp: 5 mL, Rfl: PRN     ORDER FOR ALLERGEN IMMUNOTHERAPY, Name of Mix: Mix #2  Weeds Ragweed Mixed 1:20 w/v ALK  0.5 ml Diluent: HSA qs to 5ml, Disp: 5 mL, Rfl: PRN     ORDER FOR DME, left elastic knee brace, Disp: 1 Device, Rfl: 0     RECLIPSEN 0.15-30 MG-MCG PO TABS, , Disp: , Rfl:      UNKNOWN MED DOSAGE, med for anxiety prn, Disp: , Rfl:      EPINEPHrine (AUVI-Q) 0.3 MG/0.3ML injection 2-pack, Inject 0.3 mLs (0.3 mg) into the muscle once as needed for anaphylaxis (Patient not taking: Reported on 1/3/2022), Disp: 2 each, Rfl: 2  No Known Allergies    EXAM:   BP (!) 138/93 (BP  Location: Left arm, Patient Position: Sitting, Cuff Size: Adult Large)   Pulse 97   SpO2 99%   GENERAL APPEARANCE: alert, cooperative and not in distress  SKIN: no rashes, no lesions  HEAD: atraumatic, normocephalic  EYES: lids and lashes normal  ENT: no scars or lesions, tongue midline and normal, soft palate, uvula, and tonsils normal  NECK: no asymmetry, masses, or scars  LUNGS: unlabored respirations, no intercostal retractions or accessory muscle use, clear to auscultation without rales or wheezes  HEART: regular rate and rhythm without murmurs and normal S1 and S2  MUSCULOSKELETAL: no musculoskeletal defects are noted  NEURO: no focal deficits noted  PSYCH: does not appear depressed or anxious    WORKUP:  Cluster Immunotherapy    Cluster Allergen Immunotherapy:    After explaining risks and benefits, and obtaining verbal and written consent, we proceeded with cluster immunotherapy.     VISIT  VIAL COLOR/STRENGTH  DOSES TO BE GIVEN    1  GREEN (1:1000), BLUE (1:100)  GREEN 0.1, GREEN 0.2, GREEN 0.4, BLUE 0.1    2  BLUE (1:100), YELLOW (1:10)  BLUE 0.2, BLUE 0.4, YELLOW 0.05    3  YELLOW (1:10)  YELLOW 0.1, YELLOW 0.15, YELLOW 0.25    4  YELLOW (1:10)  YELLOW 0.35, YELLOW 0.5    5  RED (1:1)  RED 0.05, RED 0.1    6  RED (1:1)  RED 0.15, RED 0.2    7  RED (1:1)  RED 0.3, RED 0.4    8  RED (1:1)  RED 0.5        VISIT 7    Time Injection Given: 13:06   Red 1:1   Weeds    0.3 mL  Red 1:1   Molds    0.3 mL    Time Injection Given: 14:05  Red 1:1   Weeds    0.4 mL  Red 1:1   Molds    0.4 mL    Start Time: 13:00  End Time: 14:38      VITALS   Time BP Pulse pOx Reaction Treatment   13:36 125/92 94 98 None None   14:38 125/98 82 99 None None       ASSESSMENT/PLAN:  Pebbles Lowry is a 31 year old female here for cluster immunotherapy.    1. Seasonal allergic rhinitis due to pollen - Pebbles tolerated today's procedure well without developing any signs or symptoms of an adverse reaction.     - return in 7-14 days to  receive first maintenance dose injection  - continue pre-medication with cetirizine as directed until tolerating monthly maintenance injections  - RAPID DESENSITIZATION    2. Allergic rhinitis due to mold    - RAPID DESENSITIZATION      Follow-up in 6-12 months, sooner if needed      Thank you for allowing me to participate in the care of Pebbles Lowry.      Hemalatha Jimenes MD, [facility]  Allergy/Immunology  [facility]      Chart documentation done in part with Dragon Voice Recognition Software. Although reviewed after completion, some word and grammatical errors may remain.      Again, thank you for allowing me to participate in the care of your patient.        Sincerely,        Hemalatha Jimenes MD

## 2022-01-03 NOTE — PROGRESS NOTES
Pebbles Lowry was seen in the Allergy Clinic at [facility].      Pebbles Lowry is a 31 year old American female who is seen today for cluster immunotherapy. She had no adverse reactions after her last visit. She has been feeling well and has not had any recent fevers or illness. Pebbles pre-medicated with cetirizine as directed prior to today's visit.    History reviewed. No pertinent past medical history.  History reviewed. No pertinent family history.  Social History     Tobacco Use     Smoking status: Never Smoker     Smokeless tobacco: Never Used   Substance Use Topics     Alcohol use: None     Drug use: None     Social History     Social History Narrative     Not on file       Past medical, family, and social history were reviewed.    REVIEW OF SYSTEMS:  General: negative for weight gain. negative for weight loss. negative for changes in sleep.   Eyes: negative for itching. negative for redness. negative for tearing/watering. negative for vision changes  Ears: negative for fullness. negative for hearing loss. negative for dizziness.   Nose: negative for snoring.negative for changes in smell. negative for drainage.   Throat: negative for hoarseness. negative for sore throat. negative for trouble swallowing.   Lungs: negative for cough. negative for shortness of breath.negative for wheezing. negative for sputum production.   Cardiovascular: negative for chest pain. negative for swelling of ankles. negative for fast or irregular heartbeat.   Gastrointestinal: negative for nausea. negative for heartburn. negative for acid reflux.   Musculoskeletal: negative for joint pain. negative for joint stiffness. negative for joint swelling.   Neurologic: negative for seizures. negative for fainting. negative for weakness.   Psychiatric: negative for changes in mood. negative for anxiety.   Endocrine: negative for cold intolerance. negative for heat intolerance. negative for tremors.   Hematologic:  negative for easy bruising. negative for easy bleeding.  Integumentary: negative for rash. negative for scaling. negative for nail changes.       Current Outpatient Medications:      albuterol (PROAIR HFA/PROVENTIL HFA/VENTOLIN HFA) 108 (90 Base) MCG/ACT inhaler, Inhale 2 puffs into the lungs every 4 hours as needed, Disp: , Rfl:      cetirizine (ZYRTEC) 10 MG tablet, Take 10 mg by mouth daily , Disp: , Rfl:      Docosahexaenoic Acid (PRENATAL DHA) 200 MG capsule, Take 200 mg by mouth daily, Disp: , Rfl:      fluticasone (FLONASE) 50 MCG/ACT nasal spray, Spray 1 spray into both nostrils daily, Disp: , Rfl:      fluticasone-salmeterol (ADVAIR) 100-50 MCG/DOSE inhaler, Inhale 1 puff into the lungs every 12 hours, Disp: , Rfl:      montelukast (SINGULAIR) 10 MG tablet, 10 mg daily, Disp: , Rfl:      NABUMETONE 500 MG PO TABS, 1 TABLET TWICE DAILY, Disp: 60 Tab, Rfl: 0     olopatadine (PATADAY) 0.2 % ophthalmic solution, 1 drop daily, Disp: , Rfl:      ORDER FOR ALLERGEN IMMUNOTHERAPY, Name of Mix: Mix #1  Mold Alternaria Tenuis 1:10 w/v, HS  0.5 ml Epicoccum Nigrum 1:10 w/v, HS 0.5 ml Diluent: HSA qs to 5ml, Disp: 5 mL, Rfl: PRN     ORDER FOR ALLERGEN IMMUNOTHERAPY, Name of Mix: Mix #2  Weeds Ragweed Mixed 1:20 w/v ALK  0.5 ml Diluent: HSA qs to 5ml, Disp: 5 mL, Rfl: PRN     ORDER FOR DME, left elastic knee brace, Disp: 1 Device, Rfl: 0     RECLIPSEN 0.15-30 MG-MCG PO TABS, , Disp: , Rfl:      UNKNOWN MED DOSAGE, med for anxiety prn, Disp: , Rfl:      EPINEPHrine (AUVI-Q) 0.3 MG/0.3ML injection 2-pack, Inject 0.3 mLs (0.3 mg) into the muscle once as needed for anaphylaxis (Patient not taking: Reported on 1/3/2022), Disp: 2 each, Rfl: 2  No Known Allergies    EXAM:   BP (!) 138/93 (BP Location: Left arm, Patient Position: Sitting, Cuff Size: Adult Large)   Pulse 97   SpO2 99%   GENERAL APPEARANCE: alert, cooperative and not in distress  SKIN: no rashes, no lesions  HEAD: atraumatic, normocephalic  EYES: lids and  lashes normal  ENT: no scars or lesions, tongue midline and normal, soft palate, uvula, and tonsils normal  NECK: no asymmetry, masses, or scars  LUNGS: unlabored respirations, no intercostal retractions or accessory muscle use, clear to auscultation without rales or wheezes  HEART: regular rate and rhythm without murmurs and normal S1 and S2  MUSCULOSKELETAL: no musculoskeletal defects are noted  NEURO: no focal deficits noted  PSYCH: does not appear depressed or anxious    WORKUP:  Cluster Immunotherapy    Cluster Allergen Immunotherapy:    After explaining risks and benefits, and obtaining verbal and written consent, we proceeded with cluster immunotherapy.     VISIT  VIAL COLOR/STRENGTH  DOSES TO BE GIVEN    1  GREEN (1:1000), BLUE (1:100)  GREEN 0.1, GREEN 0.2, GREEN 0.4, BLUE 0.1    2  BLUE (1:100), YELLOW (1:10)  BLUE 0.2, BLUE 0.4, YELLOW 0.05    3  YELLOW (1:10)  YELLOW 0.1, YELLOW 0.15, YELLOW 0.25    4  YELLOW (1:10)  YELLOW 0.35, YELLOW 0.5    5  RED (1:1)  RED 0.05, RED 0.1    6  RED (1:1)  RED 0.15, RED 0.2    7  RED (1:1)  RED 0.3, RED 0.4    8  RED (1:1)  RED 0.5        VISIT 7    Time Injection Given: 13:06   Red 1:1   Weeds    0.3 mL  Red 1:1   Molds    0.3 mL    Time Injection Given: 14:05  Red 1:1   Weeds    0.4 mL  Red 1:1   Molds    0.4 mL    Start Time: 13:00  End Time: 14:38      VITALS   Time BP Pulse pOx Reaction Treatment   13:36 125/92 94 98 None None   14:38 125/98 82 99 None None       ASSESSMENT/PLAN:  Pebbles Lowry is a 31 year old female here for cluster immunotherapy.    1. Seasonal allergic rhinitis due to pollen - Pebbles tolerated today's procedure well without developing any signs or symptoms of an adverse reaction.     - return in 7-14 days to receive first maintenance dose injection  - continue pre-medication with cetirizine as directed until tolerating monthly maintenance injections  - RAPID DESENSITIZATION    2. Allergic rhinitis due to mold    - RAPID  DESENSITIZATION      Follow-up in 6-12 months, sooner if needed      Thank you for allowing me to participate in the care of Pebbles Lowry.      Hemalatha Jimenes MD, FAAAAI  Allergy/Immunology  [facility]      Chart documentation done in part with Dragon Voice Recognition Software. Although reviewed after completion, some word and grammatical errors may remain.

## 2022-01-04 ENCOUNTER — TELEPHONE (OUTPATIENT)
Dept: ALLERGY | Facility: CLINIC | Age: 32
End: 2022-01-04
Payer: COMMERCIAL

## 2022-01-04 NOTE — TELEPHONE ENCOUNTER
Allergy serums received at [place] from [facility].     Vials received below:    Vial Color Content                      Vial Size Expiration Date  Blue 1:100 Molds 5 mL  03/23/2022  Blue 1:100 Weeds 5 mL  03/24/2022    Vial Color Content                      Vial Size Expiration Date  Yellow 1:10 Molds 5 mL  09/23/2022  Yellow 1:10 Weeds 5 mL  09/24/2022    Vial Color Content                      Vial Size Expiration Date  Red 1:1 Molds 5 mL  09/23/2022  Red 1:1 Weeds 5 mL  09/24/2022    Vial Color                         Content                      Vial Size Expiration Date  Red 1:1 Molds 5 mL  12/10/2022  Red 1:1 Weeds 5 mL  12/10/2022      Signature  Norberto Gramajo RN

## 2022-01-04 NOTE — TELEPHONE ENCOUNTER
On time couriered patient's allergy serums from [place] to [place].    Your Job ID is: [id number]     scheduled 01- @ 10:15am.    Roslyn ARIAS RN

## 2022-01-12 ENCOUNTER — ALLIED HEALTH/NURSE VISIT (OUTPATIENT)
Dept: ALLERGY | Facility: OTHER | Age: 32
End: 2022-01-12
Payer: COMMERCIAL

## 2022-01-12 DIAGNOSIS — J30.89 ALLERGIC RHINITIS DUE TO MOLD: ICD-10-CM

## 2022-01-12 DIAGNOSIS — J30.1 SEASONAL ALLERGIC RHINITIS DUE TO POLLEN: Primary | ICD-10-CM

## 2022-01-12 PROCEDURE — 99207 PR DROP WITH A PROCEDURE: CPT

## 2022-01-12 PROCEDURE — 95117 IMMUNOTHERAPY INJECTIONS: CPT

## 2022-01-12 NOTE — PROGRESS NOTES
Patient presented after waiting 30 minutes with no reaction to allergy injections. Discharged from clinic.    Norberto MEJIA RN ............   1/12/2022...3:55 PM

## 2022-02-02 ENCOUNTER — TELEPHONE (OUTPATIENT)
Dept: ALLERGY | Facility: CLINIC | Age: 32
End: 2022-02-02
Payer: COMMERCIAL

## 2022-02-02 NOTE — TELEPHONE ENCOUNTER
Pt scheduled allergy injection next week 2/8/22, this will be 27 days building. Pt was on Cluster IT and when transferred to traditional received Red 0.45mL. Routing to provider to see dosing. If we cut back one dose to Red 0.4mL or to repeat dose of Red 0.45mL? will it be ok to build by 0.1mL to top dose? Please advise.      Carito Colindres MA

## 2022-02-02 NOTE — TELEPHONE ENCOUNTER
Decrease patient to 0.4mL of red vial for all injections. She should build back to maintenance dose in 0.1mL increments every 3-14 days. Please advise patient to continue cluster pre-medication protocol until she is at monthly maintenance injections.

## 2022-02-08 ENCOUNTER — ALLIED HEALTH/NURSE VISIT (OUTPATIENT)
Dept: ALLERGY | Facility: OTHER | Age: 32
End: 2022-02-08
Payer: COMMERCIAL

## 2022-02-08 DIAGNOSIS — J30.1 SEASONAL ALLERGIC RHINITIS DUE TO POLLEN: Primary | ICD-10-CM

## 2022-02-08 DIAGNOSIS — J30.89 ALLERGIC RHINITIS DUE TO MOLD: ICD-10-CM

## 2022-02-08 PROCEDURE — 95117 IMMUNOTHERAPY INJECTIONS: CPT

## 2022-02-08 NOTE — PROGRESS NOTES
Patient presented after waiting 30 minutes with no reaction to allergy injections. Discharged from clinic.    Norberto Gramajo RN ............   2/8/2022...2:42 PM

## 2022-02-16 ENCOUNTER — ALLIED HEALTH/NURSE VISIT (OUTPATIENT)
Dept: ALLERGY | Facility: OTHER | Age: 32
End: 2022-02-16
Payer: COMMERCIAL

## 2022-02-16 DIAGNOSIS — J30.1 SEASONAL ALLERGIC RHINITIS DUE TO POLLEN: Primary | ICD-10-CM

## 2022-02-16 DIAGNOSIS — J30.89 ALLERGIC RHINITIS DUE TO MOLD: ICD-10-CM

## 2022-02-16 PROCEDURE — 95117 IMMUNOTHERAPY INJECTIONS: CPT

## 2022-02-16 NOTE — PROGRESS NOTES
Patient presented after waiting 30 minutes with no reaction to allergy injections. Discharged from clinic.    Norberto Gramajo RN ............   2/16/2022...1:35 PM

## 2022-03-09 ENCOUNTER — ALLIED HEALTH/NURSE VISIT (OUTPATIENT)
Dept: ALLERGY | Facility: OTHER | Age: 32
End: 2022-03-09
Payer: COMMERCIAL

## 2022-03-09 DIAGNOSIS — J30.89 ALLERGIC RHINITIS DUE TO MOLD: ICD-10-CM

## 2022-03-09 DIAGNOSIS — J30.1 SEASONAL ALLERGIC RHINITIS DUE TO POLLEN: Primary | ICD-10-CM

## 2022-03-09 PROCEDURE — 95117 IMMUNOTHERAPY INJECTIONS: CPT

## 2022-03-09 NOTE — PROGRESS NOTES
Patient presented after waiting 30 minutes with no reaction to allergy injections. Discharged from clinic.    Norberto Gramajo RN ............   3/9/2022...4:40 PM

## 2022-03-12 ENCOUNTER — HEALTH MAINTENANCE LETTER (OUTPATIENT)
Age: 32
End: 2022-03-12

## 2022-03-30 ENCOUNTER — TELEPHONE (OUTPATIENT)
Dept: ALLERGY | Facility: CLINIC | Age: 32
End: 2022-03-30
Payer: COMMERCIAL

## 2022-03-30 NOTE — TELEPHONE ENCOUNTER
Patient appointment canceled and rescheduled for next week. No allergist in clinic and patient needs to premedicate.       Carito Colindres MA

## 2022-03-30 NOTE — TELEPHONE ENCOUNTER
Pt has an appointment in [place] for 4pm today for allergy injections. She forgot to pre-medicate yesterday. She took the 2 tablets today but nothing yesterday.     Is she able to still come for appointment or will she need to reschedule?     Vero Santos, PEDRITON, RN, PHN  Registered Nurse-Clinic Triage  [facility]  3/30/2022 at 11:23 AM

## 2022-04-06 ENCOUNTER — ALLIED HEALTH/NURSE VISIT (OUTPATIENT)
Dept: ALLERGY | Facility: OTHER | Age: 32
End: 2022-04-06
Payer: COMMERCIAL

## 2022-04-06 DIAGNOSIS — J30.89 ALLERGIC RHINITIS DUE TO MOLD: ICD-10-CM

## 2022-04-06 DIAGNOSIS — J30.1 SEASONAL ALLERGIC RHINITIS DUE TO POLLEN: Primary | ICD-10-CM

## 2022-04-06 PROCEDURE — 95117 IMMUNOTHERAPY INJECTIONS: CPT

## 2022-04-06 NOTE — PROGRESS NOTES
Patient presented after waiting 30 minutes with no reaction to allergy injections. Discharged from clinic.    Norberto Gramajo RN ............   4/6/2022...5:02 PM

## 2022-05-04 ENCOUNTER — ALLIED HEALTH/NURSE VISIT (OUTPATIENT)
Dept: ALLERGY | Facility: OTHER | Age: 32
End: 2022-05-04
Payer: COMMERCIAL

## 2022-05-04 DIAGNOSIS — J30.89 ALLERGIC RHINITIS DUE TO MOLD: ICD-10-CM

## 2022-05-04 DIAGNOSIS — J30.1 SEASONAL ALLERGIC RHINITIS DUE TO POLLEN: Primary | ICD-10-CM

## 2022-05-04 PROCEDURE — 95117 IMMUNOTHERAPY INJECTIONS: CPT

## 2022-05-04 NOTE — PROGRESS NOTES
Patient presented after waiting 30 minutes with no reaction to allergy injections. Discharged from clinic.    Magalys Kline RN

## 2022-06-01 ENCOUNTER — TELEPHONE (OUTPATIENT)
Dept: ALLERGY | Facility: CLINIC | Age: 32
End: 2022-06-01

## 2022-06-01 ENCOUNTER — ALLIED HEALTH/NURSE VISIT (OUTPATIENT)
Dept: ALLERGY | Facility: OTHER | Age: 32
End: 2022-06-01
Payer: COMMERCIAL

## 2022-06-01 DIAGNOSIS — J30.1 SEASONAL ALLERGIC RHINITIS DUE TO POLLEN: Primary | ICD-10-CM

## 2022-06-01 DIAGNOSIS — J30.89 ALLERGIC RHINITIS DUE TO MOLD: ICD-10-CM

## 2022-06-01 PROCEDURE — 95117 IMMUNOTHERAPY INJECTIONS: CPT

## 2022-06-01 NOTE — CONFIDENTIAL NOTE
Patient will be moving to [place] next week and would to continue allergen immunotherapy injections at [facility] until she can receive injections at the new location [facility].     Patient has upcoming allergy injection appointments scheduled for [facility].     Writer scheduled OnTime Delivery Service to  serum at the [facility] 06/01/2022 at 5:00 pm.    On Time tracking number: [id number]    Norberto Gramajo RN on 6/1/2022 at 4:23 PM

## 2022-06-01 NOTE — PROGRESS NOTES
Patient presented after waiting 30 minutes with no reaction to allergy injections. Discharged from clinic.    Norberto Gramajo RN ............   6/1/2022...4:13 PM

## 2022-06-06 NOTE — TELEPHONE ENCOUNTER
[facility] has not received patient allergy serums. Routing back to [place] [facility] staff to see if patient allergy serums were picked up by .      Carito Colindres MA

## 2022-06-07 ENCOUNTER — ALLIED HEALTH/NURSE VISIT (OUTPATIENT)
Dept: ALLERGY | Facility: CLINIC | Age: 32
End: 2022-06-07
Payer: COMMERCIAL

## 2022-06-07 DIAGNOSIS — J30.1 SEASONAL ALLERGIC RHINITIS DUE TO POLLEN: Primary | ICD-10-CM

## 2022-06-07 PROCEDURE — 95117 IMMUNOTHERAPY INJECTIONS: CPT

## 2022-06-07 NOTE — TELEPHONE ENCOUNTER
MA checked [facility]. Pt allergy serums are not present. RN relayed that the  picked up Pt serums. Will have to call  service to see where patient serums are.      Carito Colindres MA

## 2022-06-07 NOTE — CONFIDENTIAL NOTE
Patient's allergy serum was delivered to [address]     Job ID: [id number] Status: Delivered  POD: [facility] [address] [place] [address]. LAB    Delivered:  6/1/2022 6:06:00 PM   No:[id number]     Norberto Gramajo RN on 6/7/2022 at 9:27 AM

## 2022-06-07 NOTE — PROGRESS NOTES
Patient presented after waiting 30 minutes with no reaction to allergy injections. Discharged from clinic.    Roslyn ARIAS RN 6/7/2022 12:46 PM

## 2022-06-07 NOTE — TELEPHONE ENCOUNTER
Allergy serums were found in the [address] refrigerator.      Roslyn ARIAS RN 6/7/2022 10:31 AM

## 2022-06-14 ENCOUNTER — ALLIED HEALTH/NURSE VISIT (OUTPATIENT)
Dept: ALLERGY | Facility: CLINIC | Age: 32
End: 2022-06-14
Payer: COMMERCIAL

## 2022-06-14 DIAGNOSIS — J30.1 SEASONAL ALLERGIC RHINITIS DUE TO POLLEN: Primary | ICD-10-CM

## 2022-06-14 PROCEDURE — 95117 IMMUNOTHERAPY INJECTIONS: CPT

## 2022-06-14 NOTE — PROGRESS NOTES
Patient presented after waiting 30 minutes with no reaction to allergy injections. Discharged from clinic.    Roslyn ARIAS RN 6/14/2022 10:42 AM

## 2022-07-18 ENCOUNTER — ALLIED HEALTH/NURSE VISIT (OUTPATIENT)
Dept: ALLERGY | Facility: CLINIC | Age: 32
End: 2022-07-18
Payer: COMMERCIAL

## 2022-07-18 DIAGNOSIS — J30.89 ALLERGIC RHINITIS DUE TO MOLD: ICD-10-CM

## 2022-07-18 DIAGNOSIS — J30.1 SEASONAL ALLERGIC RHINITIS DUE TO POLLEN: Primary | ICD-10-CM

## 2022-07-18 PROCEDURE — 95117 IMMUNOTHERAPY INJECTIONS: CPT

## 2022-07-18 NOTE — PROGRESS NOTES
Patient presented for her allergy shot appointment.  Patient stated she will be transferring to the [facility] to receive allergy shots after today.  Allergy shot staff to send patient's allergy serums tomorrow (7-).    Roslyn ARIAS RN 7/18/2022 1:51 PM

## 2022-07-18 NOTE — PROGRESS NOTES
Patient presented after waiting 30 minutes with no reaction to allergy injections. Discharged from clinic.    Roslyn ARIAS RN 7/18/2022 1:51 PM

## 2022-07-19 ENCOUNTER — TELEPHONE (OUTPATIENT)
Dept: ALLERGY | Facility: CLINIC | Age: 32
End: 2022-07-19

## 2022-07-19 NOTE — TELEPHONE ENCOUNTER
Allergy serums sent via on time  on 7-. Scheduled  time is 10:00am.  Allergy serum sent from [place] to [facility].    Your Job ID is: [id number]    Roslyn ARIAS RN 7/19/2022 9:05 AM

## 2022-08-01 ENCOUNTER — OFFICE VISIT (OUTPATIENT)
Dept: ALLERGY | Facility: CLINIC | Age: 32
End: 2022-08-01
Payer: COMMERCIAL

## 2022-08-01 VITALS — DIASTOLIC BLOOD PRESSURE: 98 MMHG | OXYGEN SATURATION: 98 % | HEART RATE: 85 BPM | SYSTOLIC BLOOD PRESSURE: 139 MMHG

## 2022-08-01 DIAGNOSIS — J45.30 MILD PERSISTENT ASTHMA WITHOUT COMPLICATION: ICD-10-CM

## 2022-08-01 DIAGNOSIS — J30.89 ALLERGIC RHINITIS DUE TO MOLD: ICD-10-CM

## 2022-08-01 DIAGNOSIS — J30.1 SEASONAL ALLERGIC RHINITIS DUE TO POLLEN: Primary | ICD-10-CM

## 2022-08-01 PROCEDURE — 99213 OFFICE O/P EST LOW 20 MIN: CPT | Performed by: ALLERGY & IMMUNOLOGY

## 2022-08-01 ASSESSMENT — ENCOUNTER SYMPTOMS
RHINORRHEA: 0
VOMITING: 0
EYE DISCHARGE: 0
CHEST TIGHTNESS: 0
DIARRHEA: 0
COUGH: 0
EYE REDNESS: 0
FEVER: 0
NAUSEA: 0
HEADACHES: 0
ACTIVITY CHANGE: 0
EYE ITCHING: 0
FACIAL SWELLING: 0
SINUS PRESSURE: 0
WHEEZING: 0
ADENOPATHY: 0
JOINT SWELLING: 0
CHILLS: 0
SHORTNESS OF BREATH: 0
ARTHRALGIAS: 0
MYALGIAS: 0

## 2022-08-01 ASSESSMENT — ASTHMA QUESTIONNAIRES: ACT_TOTALSCORE: 25

## 2022-08-01 NOTE — LETTER
8/1/2022         RE: Pebbles Lowry  [address]  [place] [address]        Dear Colleague,    Thank you for referring your patient, Pebbles Lowry, to the [facility]. Please see a copy of my visit note below.    Pebbles Lowry was seen in the Allergy Clinic at [facility].      Pebbles Lowry is a 32 year old Choose not to answer female who is seen today for a follow-up visit.    She reports that she has been doing well. She began allergen immunotherapy treatment in 11/2021 and reached her maintenance dose in 2/2022. She has no history of systemic or significant local reactions to treatment. Overall Pebbles feels that her asthma and allergy symptoms have significantly improved since beginning immunotherapy. She has discontinued both montelukast and Advair and had no recurrence of rhinitis or asthma symptoms. She is not taking antihistamines or using nasal sprays or eye drops on a regular basis.      No past medical history on file.  No family history on file.  Social History     Tobacco Use     Smoking status: Never Smoker     Smokeless tobacco: Never Used     Social History     Social History Narrative     Not on file       Past medical, family, and social history were reviewed.    Review of Systems   Constitutional: Negative for activity change, chills and fever.   HENT: Negative for congestion, dental problem, ear pain, facial swelling, nosebleeds, postnasal drip, rhinorrhea, sinus pressure and sneezing.    Eyes: Negative for discharge, redness and itching.   Respiratory: Negative for cough, chest tightness, shortness of breath and wheezing.    Cardiovascular: Negative for chest pain.   Gastrointestinal: Negative for diarrhea, nausea and vomiting.   Musculoskeletal: Negative for arthralgias, joint swelling and myalgias.   Skin: Negative for rash.   Allergic/Immunologic: Positive for environmental allergies.   Neurological: Negative for headaches.    Hematological: Negative for adenopathy.   Psychiatric/Behavioral: Negative for behavioral problems and self-injury.         Current Outpatient Medications:      albuterol (PROAIR HFA/PROVENTIL HFA/VENTOLIN HFA) 108 (90 Base) MCG/ACT inhaler, Inhale 2 puffs into the lungs every 4 hours as needed, Disp: , Rfl:      Docosahexaenoic Acid (PRENATAL DHA) 200 MG capsule, Take 200 mg by mouth daily, Disp: , Rfl:      fluticasone (FLONASE) 50 MCG/ACT nasal spray, Spray 1 spray into both nostrils daily, Disp: , Rfl:      NABUMETONE 500 MG PO TABS, 1 TABLET TWICE DAILY, Disp: 60 Tab, Rfl: 0     olopatadine (PATADAY) 0.2 % ophthalmic solution, 1 drop daily, Disp: , Rfl:      ORDER FOR ALLERGEN IMMUNOTHERAPY, Name of Mix: Mix #1  Mold Alternaria Tenuis 1:10 w/v, HS  0.5 ml Epicoccum Nigrum 1:10 w/v, HS 0.5 ml Diluent: HSA qs to 5ml, Disp: 5 mL, Rfl: PRN     ORDER FOR ALLERGEN IMMUNOTHERAPY, Name of Mix: Mix #2  Weeds Ragweed Mixed 1:20 w/v ALK  0.5 ml Diluent: HSA qs to 5ml, Disp: 5 mL, Rfl: PRN     ORDER FOR DME, left elastic knee brace, Disp: 1 Device, Rfl: 0     RECLIPSEN 0.15-30 MG-MCG PO TABS, , Disp: , Rfl:      UNKNOWN MED DOSAGE, med for anxiety prn, Disp: , Rfl:      cetirizine (ZYRTEC) 10 MG tablet, Take 10 mg by mouth daily  (Patient not taking: Reported on 8/1/2022), Disp: , Rfl:      EPINEPHrine (AUVI-Q) 0.3 MG/0.3ML injection 2-pack, Inject 0.3 mLs (0.3 mg) into the muscle once as needed for anaphylaxis (Patient not taking: Reported on 8/1/2022), Disp: 2 each, Rfl: 2  No Known Allergies    EXAM:   BP (!) 139/98 (BP Location: Right arm, Patient Position: Sitting, Cuff Size: Adult Large)   Pulse 85   SpO2 98%   Physical Exam  Vitals and nursing note reviewed.   Constitutional:       Appearance: Normal appearance.   HENT:      Head: Normocephalic and atraumatic.      Right Ear: External ear normal.      Left Ear: External ear normal.      Nose: No mucosal edema or rhinorrhea.      Mouth/Throat:      Mouth: Mucous  membranes are moist. No oral lesions.      Pharynx: Oropharynx is clear. Uvula midline. No posterior oropharyngeal erythema.   Eyes:      General: Lids are normal.      Extraocular Movements: Extraocular movements intact.      Conjunctiva/sclera: Conjunctivae normal.   Neck:      Comments: No asymmetry, masses, or scars  Cardiovascular:      Rate and Rhythm: Normal rate and regular rhythm.      Heart sounds: Normal heart sounds, S1 normal and S2 normal.   Pulmonary:      Effort: Pulmonary effort is normal. No respiratory distress.      Breath sounds: Normal breath sounds and air entry.   Musculoskeletal:      Comments: No musculoskeletal defects appreciated   Skin:     General: Skin is warm and dry.      Findings: No lesion or rash.   Neurological:      General: No focal deficit present.      Mental Status: She is alert.   Psychiatric:         Mood and Affect: Mood and affect normal.       WORKUP:  None    ASSESSMENT/PLAN:  Pebbles Lowry is a 32 year old female here for a follow-up visit.    1. Seasonal allergic rhinitis due to pollen - Pebbles has completed approximately 6 months of immunotherapy treatment at her maintenance dose. She reports significant improvement in both her rhinitis and asthma symptoms and has largely been able to discontinue her medications. We reviewed the risks, benefits, and recommended duration of treatment and she wishes to continue immunotherapy at this time.    - continue allergen immunotherapy per protocol  - epinephrine auto-injector no longer required for ongoing immunotherapy treatment  - pre-medication not required for immunotherapy    2. Allergic rhinitis due to mold - see above    3. Mild persistent asthma without complication - Well controlled since stopping ICS/LABA therapy. Rarely using albuterol. No exacerbations or oral steroid use in the past 6 months.    - take 2 to 4 puffs of albuterol HFA every 4 hours as needed  - plan to resume ICS/LABA therapy if symptoms  return      Follow-up in 1 year, sooner if needed      Thank you for allowing me to participate in the care of Pebbles Lowry.      Hemalatha Jimenes MD, FAAAAI  Allergy/Immunology  [facility]      Chart documentation done in part with Dragon Voice Recognition Software. Although reviewed after completion, some word and grammatical errors may remain.      Again, thank you for allowing me to participate in the care of your patient.        Sincerely,        Hemalatha Jimenes MD

## 2022-08-01 NOTE — PROGRESS NOTES
Pebbles Lowry was seen in the Allergy Clinic at [facility].      Pebbles Lowry is a 32 year old Choose not to answer female who is seen today for a follow-up visit.    She reports that she has been doing well. She began allergen immunotherapy treatment in 11/2021 and reached her maintenance dose in 2/2022. She has no history of systemic or significant local reactions to treatment. Overall Pebbles feels that her asthma and allergy symptoms have significantly improved since beginning immunotherapy. She has discontinued both montelukast and Advair and had no recurrence of rhinitis or asthma symptoms. She is not taking antihistamines or using nasal sprays or eye drops on a regular basis.      No past medical history on file.  No family history on file.  Social History     Tobacco Use     Smoking status: Never Smoker     Smokeless tobacco: Never Used     Social History     Social History Narrative     Not on file       Past medical, family, and social history were reviewed.    Review of Systems   Constitutional: Negative for activity change, chills and fever.   HENT: Negative for congestion, dental problem, ear pain, facial swelling, nosebleeds, postnasal drip, rhinorrhea, sinus pressure and sneezing.    Eyes: Negative for discharge, redness and itching.   Respiratory: Negative for cough, chest tightness, shortness of breath and wheezing.    Cardiovascular: Negative for chest pain.   Gastrointestinal: Negative for diarrhea, nausea and vomiting.   Musculoskeletal: Negative for arthralgias, joint swelling and myalgias.   Skin: Negative for rash.   Allergic/Immunologic: Positive for environmental allergies.   Neurological: Negative for headaches.   Hematological: Negative for adenopathy.   Psychiatric/Behavioral: Negative for behavioral problems and self-injury.         Current Outpatient Medications:      albuterol (PROAIR HFA/PROVENTIL HFA/VENTOLIN HFA) 108 (90 Base) MCG/ACT inhaler, Inhale 2 puffs  into the lungs every 4 hours as needed, Disp: , Rfl:      Docosahexaenoic Acid (PRENATAL DHA) 200 MG capsule, Take 200 mg by mouth daily, Disp: , Rfl:      fluticasone (FLONASE) 50 MCG/ACT nasal spray, Spray 1 spray into both nostrils daily, Disp: , Rfl:      NABUMETONE 500 MG PO TABS, 1 TABLET TWICE DAILY, Disp: 60 Tab, Rfl: 0     olopatadine (PATADAY) 0.2 % ophthalmic solution, 1 drop daily, Disp: , Rfl:      ORDER FOR ALLERGEN IMMUNOTHERAPY, Name of Mix: Mix #1  Mold Alternaria Tenuis 1:10 w/v, HS  0.5 ml Epicoccum Nigrum 1:10 w/v, HS 0.5 ml Diluent: HSA qs to 5ml, Disp: 5 mL, Rfl: PRN     ORDER FOR ALLERGEN IMMUNOTHERAPY, Name of Mix: Mix #2  Weeds Ragweed Mixed 1:20 w/v ALK  0.5 ml Diluent: HSA qs to 5ml, Disp: 5 mL, Rfl: PRN     ORDER FOR DME, left elastic knee brace, Disp: 1 Device, Rfl: 0     RECLIPSEN 0.15-30 MG-MCG PO TABS, , Disp: , Rfl:      UNKNOWN MED DOSAGE, med for anxiety prn, Disp: , Rfl:      cetirizine (ZYRTEC) 10 MG tablet, Take 10 mg by mouth daily  (Patient not taking: Reported on 8/1/2022), Disp: , Rfl:      EPINEPHrine (AUVI-Q) 0.3 MG/0.3ML injection 2-pack, Inject 0.3 mLs (0.3 mg) into the muscle once as needed for anaphylaxis (Patient not taking: Reported on 8/1/2022), Disp: 2 each, Rfl: 2  No Known Allergies    EXAM:   BP (!) 139/98 (BP Location: Right arm, Patient Position: Sitting, Cuff Size: Adult Large)   Pulse 85   SpO2 98%   Physical Exam  Vitals and nursing note reviewed.   Constitutional:       Appearance: Normal appearance.   HENT:      Head: Normocephalic and atraumatic.      Right Ear: External ear normal.      Left Ear: External ear normal.      Nose: No mucosal edema or rhinorrhea.      Mouth/Throat:      Mouth: Mucous membranes are moist. No oral lesions.      Pharynx: Oropharynx is clear. Uvula midline. No posterior oropharyngeal erythema.   Eyes:      General: Lids are normal.      Extraocular Movements: Extraocular movements intact.      Conjunctiva/sclera: Conjunctivae  normal.   Neck:      Comments: No asymmetry, masses, or scars  Cardiovascular:      Rate and Rhythm: Normal rate and regular rhythm.      Heart sounds: Normal heart sounds, S1 normal and S2 normal.   Pulmonary:      Effort: Pulmonary effort is normal. No respiratory distress.      Breath sounds: Normal breath sounds and air entry.   Musculoskeletal:      Comments: No musculoskeletal defects appreciated   Skin:     General: Skin is warm and dry.      Findings: No lesion or rash.   Neurological:      General: No focal deficit present.      Mental Status: She is alert.   Psychiatric:         Mood and Affect: Mood and affect normal.       WORKUP:  None    ASSESSMENT/PLAN:  Pebbles Lowry is a 32 year old female here for a follow-up visit.    1. Seasonal allergic rhinitis due to pollen - Pebbles has completed approximately 6 months of immunotherapy treatment at her maintenance dose. She reports significant improvement in both her rhinitis and asthma symptoms and has largely been able to discontinue her medications. We reviewed the risks, benefits, and recommended duration of treatment and she wishes to continue immunotherapy at this time.    - continue allergen immunotherapy per protocol  - epinephrine auto-injector no longer required for ongoing immunotherapy treatment  - pre-medication not required for immunotherapy    2. Allergic rhinitis due to mold - see above    3. Mild persistent asthma without complication - Well controlled since stopping ICS/LABA therapy. Rarely using albuterol. No exacerbations or oral steroid use in the past 6 months.    - take 2 to 4 puffs of albuterol HFA every 4 hours as needed  - plan to resume ICS/LABA therapy if symptoms return      Follow-up in 1 year, sooner if needed      Thank you for allowing me to participate in the care of Pebbles Lowry.      Hemalatha Jimenes MD, FAAAAI  Allergy/Immunology  [facility]      Chart documentation done in part with Dragon Voice Recognition Software. Although reviewed after completion, some word and grammatical errors may remain.

## 2022-08-19 ENCOUNTER — TELEPHONE (OUTPATIENT)
Dept: ALLERGY | Facility: CLINIC | Age: 32
End: 2022-08-19

## 2022-08-19 NOTE — TELEPHONE ENCOUNTER
Called pt and scheduled on 08/23/22 at 7:40 AM. Writer informed  pt to bring her Epi Pen for her allergy shots appointment and also come 15 minutes early. Pt agreed and didn't have no questions.      Matt Cabrera MA

## 2022-08-19 NOTE — TELEPHONE ENCOUNTER
[facility] Call Center    Phone Message    May a detailed message be left on voicemail: yes     Reason for Call: Other: Pt states she needs to cancel her allergy shots for today and reschedule.      Per protocols writer can not schedule allergy shots. Please call Pt back to reschedule. Writer cancelled today's visit in Epic.     Thankyou.     Action Taken: Other: CS Allergy    Travel Screening: Not Applicable

## 2022-08-23 ENCOUNTER — ALLIED HEALTH/NURSE VISIT (OUTPATIENT)
Dept: ALLERGY | Facility: CLINIC | Age: 32
End: 2022-08-23
Payer: COMMERCIAL

## 2022-08-23 DIAGNOSIS — J30.1 SEASONAL ALLERGIC RHINITIS DUE TO POLLEN: Primary | ICD-10-CM

## 2022-08-23 DIAGNOSIS — J30.89 ALLERGIC RHINITIS DUE TO MOLD: ICD-10-CM

## 2022-08-23 DIAGNOSIS — J30.9 ALLERGIC RHINITIS: ICD-10-CM

## 2022-08-23 PROCEDURE — 95117 IMMUNOTHERAPY INJECTIONS: CPT

## 2022-08-23 NOTE — PROGRESS NOTES
Patient presented after waiting 30 minutes with no reaction to allergy injections. Dose was cut back to 0.4 per immunotherapy dose schedule, build back to top by 0.1mL. Additional appointments scheduled to reach top dose. Per Dr. Jimenes's office visit note on 8/1/2022- patient no longer required to take premedication before allergy immunotherapy and no longer required to bring EpiPen with to allergy shot appointments. Discharged from clinic.     Arline Selby, PEDRITON, RN

## 2022-09-06 ENCOUNTER — ALLIED HEALTH/NURSE VISIT (OUTPATIENT)
Dept: ALLERGY | Facility: CLINIC | Age: 32
End: 2022-09-06
Payer: COMMERCIAL

## 2022-09-06 DIAGNOSIS — J30.1 SEASONAL ALLERGIC RHINITIS DUE TO POLLEN: Primary | ICD-10-CM

## 2022-09-06 DIAGNOSIS — J30.89 ALLERGIC RHINITIS DUE TO MOLD: ICD-10-CM

## 2022-09-06 DIAGNOSIS — J30.9 ALLERGIC RHINITIS: ICD-10-CM

## 2022-09-06 PROCEDURE — 95117 IMMUNOTHERAPY INJECTIONS: CPT

## 2022-09-06 NOTE — PROGRESS NOTES
Patient presented after waiting 30 minutes with no reaction to allergy injections. Discharged from clinic.    Chica MAJORN, RN  PEDRITO DiamondN, RN

## 2022-09-13 ENCOUNTER — ALLIED HEALTH/NURSE VISIT (OUTPATIENT)
Dept: ALLERGY | Facility: CLINIC | Age: 32
End: 2022-09-13
Payer: COMMERCIAL

## 2022-09-13 DIAGNOSIS — J30.9 ALLERGIC RHINITIS: ICD-10-CM

## 2022-09-13 DIAGNOSIS — J30.1 SEASONAL ALLERGIC RHINITIS DUE TO POLLEN: Primary | ICD-10-CM

## 2022-09-13 DIAGNOSIS — J30.89 ALLERGIC RHINITIS DUE TO MOLD: ICD-10-CM

## 2022-09-13 PROCEDURE — 95117 IMMUNOTHERAPY INJECTIONS: CPT

## 2022-09-13 NOTE — PROGRESS NOTES
Patient presented after waiting 30 minutes with no reaction to allergy injections. Discharged from clinic.    Chica BEVERLY, RN

## 2022-10-05 ENCOUNTER — TELEPHONE (OUTPATIENT)
Dept: ALLERGY | Facility: CLINIC | Age: 32
End: 2022-10-05

## 2022-10-05 NOTE — TELEPHONE ENCOUNTER
[facility] Call Center    Phone Message    May a detailed message be left on voicemail: yes     Reason for Call: Other: Patient would like to reschedule her 10/11/2022 appointment. Writer unable to schedule so did not cancel.      Please call back to discuss    Thank you    Action Taken: Message routed to:  [facility] allergy    Travel Screening: Not Applicable

## 2022-10-10 NOTE — TELEPHONE ENCOUNTER
Called patient to reschedule her allergy shot appointment to a different time on 10/11. No further actions needed.    Arline Selby, BSN, RN

## 2022-10-11 ENCOUNTER — ALLIED HEALTH/NURSE VISIT (OUTPATIENT)
Dept: ALLERGY | Facility: CLINIC | Age: 32
End: 2022-10-11
Payer: COMMERCIAL

## 2022-10-11 DIAGNOSIS — J30.89 ALLERGIC RHINITIS DUE TO MOLD: ICD-10-CM

## 2022-10-11 DIAGNOSIS — J30.9 ALLERGIC RHINITIS: ICD-10-CM

## 2022-10-11 DIAGNOSIS — J30.1 SEASONAL ALLERGIC RHINITIS DUE TO POLLEN: Primary | ICD-10-CM

## 2022-10-11 PROCEDURE — 95117 IMMUNOTHERAPY INJECTIONS: CPT

## 2022-10-11 NOTE — PROGRESS NOTES
Patient presented after waiting 30 minutes with no reaction to allergy injections. Serum refill consent signed prior to patient leaving. Patient is no longer required to premedicate or bring Epipen with to appointments. Discharged from clinic.    Arline Selby, PEDRITON, RN

## 2022-11-08 ENCOUNTER — ALLIED HEALTH/NURSE VISIT (OUTPATIENT)
Dept: ALLERGY | Facility: CLINIC | Age: 32
End: 2022-11-08
Payer: COMMERCIAL

## 2022-11-08 DIAGNOSIS — J30.1 SEASONAL ALLERGIC RHINITIS DUE TO POLLEN: Primary | ICD-10-CM

## 2022-11-08 DIAGNOSIS — J30.89 ALLERGIC RHINITIS DUE TO MOLD: ICD-10-CM

## 2022-11-08 DIAGNOSIS — J30.1 SEASONAL ALLERGIC RHINITIS DUE TO POLLEN: ICD-10-CM

## 2022-11-08 PROCEDURE — 95125 IMMUNOTHERAPY 2/> INJECTIONS: CPT

## 2022-11-08 NOTE — PROGRESS NOTES
Pebbles Lowry presents to clinic today at the request of Hemalatha Jimenes MD (ordering provider) for Allergy Immunotherapy injection(s).       This service provided today was under the care of Asren Dunham MD; the supervising provider of the day; who was available if needed.      Patient presented after waiting 30 minutes with no reaction to  injections. Discharged from clinic.    Arline Selby BSN, RN

## 2022-11-08 NOTE — TELEPHONE ENCOUNTER
ALLERGY SOLUTION RE-ORDER REQUEST    Pebbles Lowry 1990 MRN: [id number]    DATE NEEDED:  12/6/2022  Vial Color Content    Vial Size  Red 1:1 Weeds    5 mL  Red 1:1 Molds   5 mL      Serum reorder consent signed and patient/parent was advised that new serums would be ordered through the pharmacy and billed to their insurance company when they arrive in clinic. Yes    Shot Clinic Location:  [facility]  Ship to Location: [facility]  Serum billed to:  [employer]    Special Instructions:          Requester Signature  Patti Cruz, [facility]

## 2022-11-11 DIAGNOSIS — J30.1 SEASONAL ALLERGIC RHINITIS DUE TO POLLEN: ICD-10-CM

## 2022-11-11 DIAGNOSIS — J30.89 ALLERGIC RHINITIS DUE TO MOLD: Primary | ICD-10-CM

## 2022-11-11 PROCEDURE — 95165 ANTIGEN THERAPY SERVICES: CPT | Performed by: ALLERGY & IMMUNOLOGY

## 2022-11-11 NOTE — PROGRESS NOTES
Allergy serums billed to [employer].     Vials billed below:    Vial Color Content                      Vial Size Expiration Date  Red 1:1 Weeds 5mL  11/11/23  Red 1:1 Molds 5mL  11/11/23    Billed 20 units    Checked by Jamie Moy / LPN        Signature  Jamie Moy LPN

## 2022-11-15 NOTE — PROGRESS NOTES
Allergy serums received at [facility].    Vials received below:    Vial Color Content                      Vial Size Expiration Date  Red 1:1 Molds 5 mL  11/11/23    Red 1:1 Weeds 5mL  11/11/23        Signature  Garrett Nogueira LPN

## 2022-12-06 ENCOUNTER — ALLIED HEALTH/NURSE VISIT (OUTPATIENT)
Dept: ALLERGY | Facility: CLINIC | Age: 32
End: 2022-12-06
Payer: COMMERCIAL

## 2022-12-06 DIAGNOSIS — J30.1 SEASONAL ALLERGIC RHINITIS DUE TO POLLEN: ICD-10-CM

## 2022-12-06 DIAGNOSIS — J30.89 ALLERGIC RHINITIS DUE TO MOLD: Primary | ICD-10-CM

## 2022-12-06 PROCEDURE — 95125 IMMUNOTHERAPY 2/> INJECTIONS: CPT

## 2022-12-06 NOTE — PROGRESS NOTES
Pebbles Lowry presents to clinic today at the request of Hemalatha Jimenes MD (ordering provider) for Allergy Immunotherapy injection(s).       This service provided today was under the care of Arsen Dunham MD; the supervising provider of the day; who was available if needed.      Patient presented after waiting 30 minutes with no reaction to  injections. Discharged from clinic.    Arline Selby, PEDRITON, RN

## 2022-12-28 ENCOUNTER — TELEPHONE (OUTPATIENT)
Dept: ALLERGY | Facility: CLINIC | Age: 32
End: 2022-12-28

## 2022-12-28 NOTE — TELEPHONE ENCOUNTER
[facility] Call Center    Phone Message    May a detailed message be left on voicemail: yes     Reason for Call: Other: Patient would like to schedule an allergy injection for 01/03/2023   Please call back to discuss  Thank you    Action Taken: Message routed to:  [facility] Allergy    Travel Screening: Not Applicable

## 2023-01-03 ENCOUNTER — ALLIED HEALTH/NURSE VISIT (OUTPATIENT)
Dept: ALLERGY | Facility: CLINIC | Age: 33
End: 2023-01-03
Payer: COMMERCIAL

## 2023-01-03 DIAGNOSIS — J30.1 SEASONAL ALLERGIC RHINITIS DUE TO POLLEN: ICD-10-CM

## 2023-01-03 DIAGNOSIS — J30.89 ALLERGIC RHINITIS DUE TO MOLD: Primary | ICD-10-CM

## 2023-01-03 DIAGNOSIS — J30.9 ALLERGIC RHINITIS: ICD-10-CM

## 2023-01-03 PROCEDURE — 95125 IMMUNOTHERAPY 2/> INJECTIONS: CPT

## 2023-01-03 NOTE — PROGRESS NOTES
Pebbles Lowry presents to clinic today at the request of Hemalatha Jimenes MD (ordering provider) for Allergy Immunotherapy injection(s).     This service provided today was under the care of Arsen Dunham MD; the supervising provider of the day; who was available if needed.    Patient presented after waiting 30 minutes with no reaction to  injections. Discharged from clinic.    Scotty GUZMAN RN, BSN

## 2023-01-03 NOTE — TELEPHONE ENCOUNTER
Called pt and scheduled for today 1/3/23 at 10:30 AM. Pt is on her 4 weeks after last allergy shot.     Matt Cabrera MA

## 2023-01-03 NOTE — TELEPHONE ENCOUNTER
[facility] Call Center    Phone Message    May a detailed message be left on voicemail: yes     Reason for Call: Appointment Intake    Referring Provider Name: NA  Diagnosis and/or Symptoms: Allergy injections  Pt is still waiting for a call back to schedule allergy injections. Please call pt back to discuss.   Protocols state: Clinic review (encounter) for Nurse to schedule  Thanks   Action Taken: Message routed to:  [facility] Allergy    Travel Screening: Not Applicable

## 2023-01-07 ENCOUNTER — HEALTH MAINTENANCE LETTER (OUTPATIENT)
Age: 33
End: 2023-01-07

## 2023-01-10 ENCOUNTER — ALLIED HEALTH/NURSE VISIT (OUTPATIENT)
Dept: ALLERGY | Facility: CLINIC | Age: 33
End: 2023-01-10
Payer: COMMERCIAL

## 2023-01-10 DIAGNOSIS — J30.1 SEASONAL ALLERGIC RHINITIS DUE TO POLLEN: ICD-10-CM

## 2023-01-10 DIAGNOSIS — J30.89 ALLERGIC RHINITIS DUE TO MOLD: Primary | ICD-10-CM

## 2023-01-10 DIAGNOSIS — J30.9 ALLERGIC RHINITIS: ICD-10-CM

## 2023-01-10 PROCEDURE — 95125 IMMUNOTHERAPY 2/> INJECTIONS: CPT

## 2023-01-16 ENCOUNTER — TELEPHONE (OUTPATIENT)
Dept: ALLERGY | Facility: CLINIC | Age: 33
End: 2023-01-16

## 2023-01-16 NOTE — TELEPHONE ENCOUNTER
[facility] Call Center    Phone Message    May a detailed message be left on voicemail: yes     Reason for Call: Other: Pt needs to reschedule her allergy shot appointment on 1/17. Please call. Okay to leave a detailed message.    Action Taken: Message routed to:  Other: CS Allergy    Travel Screening: Not Applicable

## 2023-01-16 NOTE — TELEPHONE ENCOUNTER
Called and spoke with patient about rescheduling allergy shot. Patient was rescheduled to this Friday for her next allergy shot. No further actions needed.     Arline Selby, BSN, RN

## 2023-01-20 ENCOUNTER — ALLIED HEALTH/NURSE VISIT (OUTPATIENT)
Dept: ALLERGY | Facility: CLINIC | Age: 33
End: 2023-01-20
Payer: COMMERCIAL

## 2023-01-20 DIAGNOSIS — J30.9 ALLERGIC RHINITIS: ICD-10-CM

## 2023-01-20 DIAGNOSIS — J30.1 SEASONAL ALLERGIC RHINITIS DUE TO POLLEN: ICD-10-CM

## 2023-01-20 DIAGNOSIS — J30.89 ALLERGIC RHINITIS DUE TO MOLD: Primary | ICD-10-CM

## 2023-01-20 PROCEDURE — 95125 IMMUNOTHERAPY 2/> INJECTIONS: CPT

## 2023-02-24 ENCOUNTER — ALLIED HEALTH/NURSE VISIT (OUTPATIENT)
Dept: ALLERGY | Facility: CLINIC | Age: 33
End: 2023-02-24
Payer: COMMERCIAL

## 2023-02-24 DIAGNOSIS — J30.9 ALLERGIC RHINITIS: ICD-10-CM

## 2023-02-24 DIAGNOSIS — J30.89 ALLERGIC RHINITIS DUE TO MOLD: Primary | ICD-10-CM

## 2023-02-24 DIAGNOSIS — J30.1 SEASONAL ALLERGIC RHINITIS DUE TO POLLEN: ICD-10-CM

## 2023-02-24 PROCEDURE — 95125 IMMUNOTHERAPY 2/> INJECTIONS: CPT

## 2023-02-24 NOTE — PROGRESS NOTES
Pebbles Lowry presents to clinic today at the request of Arsen Dunham MD (ordering provider) for Allergy Immunotherapy injection(s).     This service provided today was under the care of Arsen Dunham MD; the supervising provider of the day; who was available if needed.    Patient presented after waiting 30 minutes with no reaction to  injections. Discharged from clinic.    Scotty GUZMAN RN, BSN

## 2023-03-13 ENCOUNTER — TELEPHONE (OUTPATIENT)
Dept: ALLERGY | Facility: CLINIC | Age: 33
End: 2023-03-13
Payer: COMMERCIAL

## 2023-03-13 NOTE — TELEPHONE ENCOUNTER
[facility] Call Center    Phone Message    May a detailed message be left on voicemail: yes     Reason for Call: Other: Pt unable to make 3/21 allergy shot appointment. Please call to reschedule for this week or the week of the 28th.  Okay to leave voicemail or send MyChart message    Action Taken: Message routed to:  Other: CS Allergy    Travel Screening: Not Applicable

## 2023-03-15 ENCOUNTER — ALLIED HEALTH/NURSE VISIT (OUTPATIENT)
Dept: ALLERGY | Facility: CLINIC | Age: 33
End: 2023-03-15
Payer: COMMERCIAL

## 2023-03-15 DIAGNOSIS — J30.1 SEASONAL ALLERGIC RHINITIS DUE TO POLLEN: ICD-10-CM

## 2023-03-15 DIAGNOSIS — J30.89 ALLERGIC RHINITIS DUE TO MOLD: Primary | ICD-10-CM

## 2023-03-15 DIAGNOSIS — J30.9 ALLERGIC RHINITIS: ICD-10-CM

## 2023-03-15 PROCEDURE — 95125 IMMUNOTHERAPY 2/> INJECTIONS: CPT

## 2023-04-18 ENCOUNTER — ALLIED HEALTH/NURSE VISIT (OUTPATIENT)
Dept: ALLERGY | Facility: CLINIC | Age: 33
End: 2023-04-18
Payer: COMMERCIAL

## 2023-04-18 DIAGNOSIS — J30.89 ALLERGIC RHINITIS DUE TO MOLD: Primary | ICD-10-CM

## 2023-04-18 DIAGNOSIS — J30.9 ALLERGIC RHINITIS: ICD-10-CM

## 2023-04-18 DIAGNOSIS — J30.1 SEASONAL ALLERGIC RHINITIS DUE TO POLLEN: ICD-10-CM

## 2023-04-18 PROCEDURE — 95125 IMMUNOTHERAPY 2/> INJECTIONS: CPT

## 2023-04-22 ENCOUNTER — HEALTH MAINTENANCE LETTER (OUTPATIENT)
Age: 33
End: 2023-04-22

## 2023-05-16 ENCOUNTER — ALLIED HEALTH/NURSE VISIT (OUTPATIENT)
Dept: ALLERGY | Facility: CLINIC | Age: 33
End: 2023-05-16
Payer: COMMERCIAL

## 2023-05-16 DIAGNOSIS — J30.89 ALLERGIC RHINITIS DUE TO MOLD: Primary | ICD-10-CM

## 2023-05-16 DIAGNOSIS — J30.1 SEASONAL ALLERGIC RHINITIS DUE TO POLLEN: ICD-10-CM

## 2023-05-16 DIAGNOSIS — J30.9 ALLERGIC RHINITIS: ICD-10-CM

## 2023-05-16 PROCEDURE — 95125 IMMUNOTHERAPY 2/> INJECTIONS: CPT

## 2023-06-07 ENCOUNTER — TELEPHONE (OUTPATIENT)
Dept: ALLERGY | Facility: CLINIC | Age: 33
End: 2023-06-07
Payer: COMMERCIAL

## 2023-06-07 NOTE — TELEPHONE ENCOUNTER
[facility] Call Center    Phone Message    May a detailed message be left on voicemail: yes     Reason for Call: Other: Per Pt Pebbles would like to schedule a few allergy shot appointments starting with next week if possible. Okay to leave a VM or send MyChart messages    Action Taken: Message routed to:  Other: CS Allergy    Travel Screening: Not Applicable

## 2023-06-08 NOTE — TELEPHONE ENCOUNTER
Called and spoke with patient to schedule allergy shot appointments. Patient is scheduled for monthly injections through September. Will update us if any dates need to change.     PEDRITO DiamondN, RN

## 2023-06-12 ENCOUNTER — ALLIED HEALTH/NURSE VISIT (OUTPATIENT)
Dept: ALLERGY | Facility: CLINIC | Age: 33
End: 2023-06-12
Payer: COMMERCIAL

## 2023-06-12 DIAGNOSIS — J30.1 SEASONAL ALLERGIC RHINITIS DUE TO POLLEN: ICD-10-CM

## 2023-06-12 DIAGNOSIS — J30.89 ALLERGIC RHINITIS DUE TO MOLD: ICD-10-CM

## 2023-06-12 DIAGNOSIS — J30.89 ALLERGIC RHINITIS DUE TO MOLD: Primary | ICD-10-CM

## 2023-06-12 DIAGNOSIS — J30.9 ALLERGIC RHINITIS: ICD-10-CM

## 2023-06-12 PROCEDURE — 95125 IMMUNOTHERAPY 2/> INJECTIONS: CPT

## 2023-06-12 NOTE — PROGRESS NOTES
Pebbles Lowry presents to clinic today at the request of Hemalatha Jimenes MD (ordering provider) for Allergy Immunotherapy injection(s).       This service provided today was under the care of Arsen Dunham MD; the supervising provider of the day; who was available if needed.      Patient presented after waiting 30 minutes with no reaction to  injections. Discharged from clinic. Consent form signed for serum reorder. Reorder sent to the mixing pharmacy.     PEDRITO DiamondN, RN

## 2023-06-12 NOTE — TELEPHONE ENCOUNTER
ALLERGY SOLUTION RE-ORDER REQUEST    Pebbles Lowry 1990 MRN: [id number]    DATE NEEDED:  07/06/2023  Vial Color Content    Vial Size  Red 1:1 Weeds    5 mL  Red 1:1 Molds   5 mL      Serum reorder consent signed and patient/parent was advised that new serums would be ordered through the pharmacy and billed to their insurance company when they arrive in clinic. Yes    Shot Clinic Location:  [facility].  Ship to Location: [facility].  Serum billed to:  [facility].    Special Instructions:  none        Requester Signature  Garrett Nogueira LPN

## 2023-06-15 DIAGNOSIS — J30.1 SEASONAL ALLERGIC RHINITIS DUE TO POLLEN: ICD-10-CM

## 2023-06-15 DIAGNOSIS — J30.89 ALLERGIC RHINITIS DUE TO MOLD: Primary | ICD-10-CM

## 2023-06-15 PROCEDURE — 95165 ANTIGEN THERAPY SERVICES: CPT | Performed by: ALLERGY & IMMUNOLOGY

## 2023-06-15 NOTE — PROGRESS NOTES
Allergy serums billed to [employer].     Vials billed below:    Vial Color Content                      Vial Size Expiration Date  Red 1:1 Weeds 5mL  6/15/24  Red 1:1 Molds 5mL  6/15/24    Billed 20 units    Checked by Jamie Moy / LPN        Signature  Jamie Moy LPN

## 2023-06-27 NOTE — PROGRESS NOTES
Allergy serums received at [facility].    Vials received below:    Vial Color Content                      Vial Size Expiration Date  Red 1:1 Weeds 5mL  06/15/2024  Red 1:1 Molds 5mL  06/15/2024        Signature  Arline Selby RN

## 2023-07-07 ENCOUNTER — TELEPHONE (OUTPATIENT)
Dept: ALLERGY | Facility: CLINIC | Age: 33
End: 2023-07-07
Payer: COMMERCIAL

## 2023-07-07 NOTE — TELEPHONE ENCOUNTER
[facility] Call Center    Phone Message    May a detailed message be left on voicemail: yes     Reason for Call: Appointment Intake    Referring Provider Name: YAZ  Diagnosis and/or Symptoms: pt would like to reschedule her allergy shot Appt 07/10/23. Please call pt to reschedule. Thanks     Action Taken: Message routed to:  Other: CS allergy    Travel Screening: Not Applicable

## 2023-07-14 ENCOUNTER — ALLIED HEALTH/NURSE VISIT (OUTPATIENT)
Dept: ALLERGY | Facility: CLINIC | Age: 33
End: 2023-07-14
Payer: COMMERCIAL

## 2023-07-14 DIAGNOSIS — J30.89 ALLERGIC RHINITIS DUE TO MOLD: Primary | ICD-10-CM

## 2023-07-14 DIAGNOSIS — J30.9 ALLERGIC RHINITIS: ICD-10-CM

## 2023-07-14 DIAGNOSIS — J30.1 SEASONAL ALLERGIC RHINITIS DUE TO POLLEN: ICD-10-CM

## 2023-07-14 PROCEDURE — 95125 IMMUNOTHERAPY 2/> INJECTIONS: CPT

## 2023-07-14 NOTE — PROGRESS NOTES
Pebbles Lowry presents to clinic today at the request of Arsen Dunham MD (ordering provider) for Allergy Immunotherapy injection(s).       This service provided today was under the care of Arsen Dunham MD; the supervising provider of the day; who was available if needed.      Patient presented after waiting 30 minutes with no reaction to  injections. Discharged from clinic.    Arline Selby, PEDRITON, RN

## 2023-08-07 ENCOUNTER — ALLIED HEALTH/NURSE VISIT (OUTPATIENT)
Dept: ALLERGY | Facility: CLINIC | Age: 33
End: 2023-08-07
Payer: COMMERCIAL

## 2023-08-07 DIAGNOSIS — J30.1 SEASONAL ALLERGIC RHINITIS DUE TO POLLEN: ICD-10-CM

## 2023-08-07 DIAGNOSIS — J30.89 ALLERGIC RHINITIS DUE TO MOLD: Primary | ICD-10-CM

## 2023-08-07 DIAGNOSIS — J30.9 ALLERGIC RHINITIS: ICD-10-CM

## 2023-08-07 PROCEDURE — 95125 IMMUNOTHERAPY 2/> INJECTIONS: CPT

## 2023-08-28 ENCOUNTER — ALLIED HEALTH/NURSE VISIT (OUTPATIENT)
Dept: ALLERGY | Facility: CLINIC | Age: 33
End: 2023-08-28
Payer: COMMERCIAL

## 2023-08-28 DIAGNOSIS — J30.1 SEASONAL ALLERGIC RHINITIS DUE TO POLLEN: ICD-10-CM

## 2023-08-28 DIAGNOSIS — J30.9 ALLERGIC RHINITIS: ICD-10-CM

## 2023-08-28 DIAGNOSIS — J30.89 ALLERGIC RHINITIS DUE TO MOLD: Primary | ICD-10-CM

## 2023-08-28 PROCEDURE — 95125 IMMUNOTHERAPY 2/> INJECTIONS: CPT

## 2023-09-05 ENCOUNTER — ALLIED HEALTH/NURSE VISIT (OUTPATIENT)
Dept: ALLERGY | Facility: CLINIC | Age: 33
End: 2023-09-05
Payer: COMMERCIAL

## 2023-09-05 DIAGNOSIS — J30.9 ALLERGIC RHINITIS: ICD-10-CM

## 2023-09-05 DIAGNOSIS — J30.89 ALLERGIC RHINITIS DUE TO MOLD: Primary | ICD-10-CM

## 2023-09-05 DIAGNOSIS — J30.1 SEASONAL ALLERGIC RHINITIS DUE TO POLLEN: ICD-10-CM

## 2023-09-05 PROCEDURE — 95125 IMMUNOTHERAPY 2/> INJECTIONS: CPT

## 2023-09-19 ENCOUNTER — OFFICE VISIT (OUTPATIENT)
Dept: ALLERGY | Facility: CLINIC | Age: 33
End: 2023-09-19
Payer: COMMERCIAL

## 2023-09-19 DIAGNOSIS — J30.1 SEASONAL ALLERGIC RHINITIS DUE TO POLLEN: ICD-10-CM

## 2023-09-19 DIAGNOSIS — J30.9 ALLERGIC RHINITIS: ICD-10-CM

## 2023-09-19 DIAGNOSIS — J30.89 ALLERGIC RHINITIS DUE TO MOLD: Primary | ICD-10-CM

## 2023-09-19 PROCEDURE — 95125 IMMUNOTHERAPY 2/> INJECTIONS: CPT

## 2023-09-19 NOTE — PROGRESS NOTES
Pebbles Lowry presents to clinic today at the request of Hemalatha Jimenes MD (ordering provider) for Allergy Immunotherapy injection(s).     This service provided today was under the care of Arsen Dunham MD; the supervising provider of the day; who was available if needed.    Patient presented after waiting 30 minutes with no reaction to  injections. Discharged from clinic.    PEDRITO UriasN, RN

## 2023-10-16 ENCOUNTER — ALLIED HEALTH/NURSE VISIT (OUTPATIENT)
Dept: ALLERGY | Facility: CLINIC | Age: 33
End: 2023-10-16
Payer: COMMERCIAL

## 2023-10-16 DIAGNOSIS — J30.1 SEASONAL ALLERGIC RHINITIS DUE TO POLLEN: ICD-10-CM

## 2023-10-16 DIAGNOSIS — J30.9 ALLERGIC RHINITIS: ICD-10-CM

## 2023-10-16 DIAGNOSIS — J30.89 ALLERGIC RHINITIS DUE TO MOLD: Primary | ICD-10-CM

## 2023-10-16 PROCEDURE — 95125 IMMUNOTHERAPY 2/> INJECTIONS: CPT

## 2023-10-16 NOTE — PROGRESS NOTES
Pebbles Lowry presents to clinic today at the request of Hemalatha Jimenes MD (ordering provider) for Allergy Immunotherapy injection(s).     This service provided today was under the care of Arsen Dunham MD; the supervising provider of the day; who was available if needed.    Patient presented after waiting 30 minutes with no reaction to  injections. Discharged from clinic.    PEDRITO UriasN, RN   Niacinamide Pregnancy And Lactation Text: These medications are considered safe during pregnancy.

## 2023-11-06 ENCOUNTER — ALLIED HEALTH/NURSE VISIT (OUTPATIENT)
Dept: ALLERGY | Facility: CLINIC | Age: 33
End: 2023-11-06
Payer: COMMERCIAL

## 2023-11-06 DIAGNOSIS — J30.1 SEASONAL ALLERGIC RHINITIS DUE TO POLLEN: ICD-10-CM

## 2023-11-06 DIAGNOSIS — J30.89 ALLERGIC RHINITIS DUE TO MOLD: Primary | ICD-10-CM

## 2023-11-06 DIAGNOSIS — J30.9 ALLERGIC RHINITIS: ICD-10-CM

## 2023-11-06 PROCEDURE — 95125 IMMUNOTHERAPY 2/> INJECTIONS: CPT

## 2023-12-04 ENCOUNTER — ALLIED HEALTH/NURSE VISIT (OUTPATIENT)
Dept: ALLERGY | Facility: CLINIC | Age: 33
End: 2023-12-04
Payer: COMMERCIAL

## 2023-12-04 DIAGNOSIS — J30.1 SEASONAL ALLERGIC RHINITIS DUE TO POLLEN: ICD-10-CM

## 2023-12-04 DIAGNOSIS — J30.9 ALLERGIC RHINITIS: ICD-10-CM

## 2023-12-04 DIAGNOSIS — J30.89 ALLERGIC RHINITIS DUE TO MOLD: Primary | ICD-10-CM

## 2023-12-04 PROCEDURE — 95125 IMMUNOTHERAPY 2/> INJECTIONS: CPT

## 2024-01-05 ENCOUNTER — ALLIED HEALTH/NURSE VISIT (OUTPATIENT)
Dept: ALLERGY | Facility: CLINIC | Age: 34
End: 2024-01-05
Payer: COMMERCIAL

## 2024-01-05 DIAGNOSIS — J30.89 ALLERGIC RHINITIS DUE TO MOLD: ICD-10-CM

## 2024-01-05 DIAGNOSIS — J30.1 SEASONAL ALLERGIC RHINITIS DUE TO POLLEN: Primary | ICD-10-CM

## 2024-01-05 DIAGNOSIS — J30.9 ALLERGIC RHINITIS: ICD-10-CM

## 2024-01-05 PROCEDURE — 95125 IMMUNOTHERAPY 2/> INJECTIONS: CPT

## 2024-01-05 NOTE — PROGRESS NOTES
Pebbles Lowry presents to clinic today at the request of Arsen Dunham MD (ordering provider) for Allergy Immunotherapy injection(s).     This service provided today was under the care of Williams Escalona MD; the supervising provider of the day; who was available if needed.    Patient presented after waiting 30 minutes with no reaction to  injections. Discharged from clinic.    PEDRITO UriasN, RN

## 2024-01-30 ENCOUNTER — ALLIED HEALTH/NURSE VISIT (OUTPATIENT)
Dept: ALLERGY | Facility: CLINIC | Age: 34
End: 2024-01-30
Payer: COMMERCIAL

## 2024-01-30 DIAGNOSIS — J30.89 ALLERGIC RHINITIS DUE TO MOLD: ICD-10-CM

## 2024-01-30 DIAGNOSIS — J30.1 SEASONAL ALLERGIC RHINITIS DUE TO POLLEN: Primary | ICD-10-CM

## 2024-01-30 DIAGNOSIS — J30.9 ALLERGIC RHINITIS: ICD-10-CM

## 2024-01-30 PROCEDURE — 95125 IMMUNOTHERAPY 2/> INJECTIONS: CPT

## 2024-01-30 NOTE — PROGRESS NOTES
Pebbles Lowry presents to clinic today at the request of Hemalatha Jimenes MD (ordering provider) for Allergy Immunotherapy injection(s).       This service provided today was under the care of Arsen Dunham MD; the supervising provider of the day; who was available if needed.      Patient presented after waiting 30 minutes with no reaction to injections. Discharged from clinic.    Arline Selby, RN, BSN

## 2024-02-27 ENCOUNTER — ALLIED HEALTH/NURSE VISIT (OUTPATIENT)
Dept: ALLERGY | Facility: CLINIC | Age: 34
End: 2024-02-27
Payer: COMMERCIAL

## 2024-02-27 DIAGNOSIS — J30.1 SEASONAL ALLERGIC RHINITIS DUE TO POLLEN: Primary | ICD-10-CM

## 2024-02-27 DIAGNOSIS — J30.89 ALLERGIC RHINITIS DUE TO MOLD: ICD-10-CM

## 2024-02-27 DIAGNOSIS — J30.9 ALLERGIC RHINITIS: ICD-10-CM

## 2024-02-27 DIAGNOSIS — J30.1 SEASONAL ALLERGIC RHINITIS DUE TO POLLEN: ICD-10-CM

## 2024-02-27 PROCEDURE — 95125 IMMUNOTHERAPY 2/> INJECTIONS: CPT

## 2024-02-27 NOTE — TELEPHONE ENCOUNTER
ALLERGY SOLUTION RE-ORDER REQUEST    Pebbles Lowry 1990 MRN: [id number]    DATE NEEDED:  2 weeks  Vial Color Content    Vial Size  Red 1:1 Molds    5 mL  Red 1:1 Weeds   5 mL      Serum reorder consent signed and patient/parent was advised that new serums would be ordered through the pharmacy and billed to their insurance company when they arrive in clinic. Yes    Shot Clinic Location:  [facility].  Ship to Location: [facility].  Serum billed to:  [facility].    Special Instructions:  Ship when ready         Requester Signature  Matt Cabrera MA

## 2024-02-29 DIAGNOSIS — J30.89 ALLERGIC RHINITIS DUE TO MOLD: Primary | ICD-10-CM

## 2024-02-29 DIAGNOSIS — J30.1 SEASONAL ALLERGIC RHINITIS DUE TO POLLEN: ICD-10-CM

## 2024-02-29 PROCEDURE — 95165 ANTIGEN THERAPY SERVICES: CPT | Performed by: ALLERGY & IMMUNOLOGY

## 2024-02-29 NOTE — PROGRESS NOTES
Allergy serums billed to [facility].     Vials billed below:    Vial Color Content                      Vial Size Expiration Date  Red 1:1 Weeds 5mL  2/28/25  Red 1:1 Molds 5mL  2/28/25      Billed 20 units    Checked by Jamie Moy / LPN        Signature  Jamie Moy LPN

## 2024-03-06 NOTE — PROGRESS NOTES
Allergy serums received at [facility].    Vials received below:    Vial Color Content                      Vial Size Expiration Date  Red 1:1 Weeds 5mL  2/28/2025  Red 1:1 Molds 5mL  2/28/2025        Signature  Arline Selby RN

## 2024-03-25 ENCOUNTER — ALLIED HEALTH/NURSE VISIT (OUTPATIENT)
Dept: ALLERGY | Facility: CLINIC | Age: 34
End: 2024-03-25
Payer: COMMERCIAL

## 2024-03-25 ENCOUNTER — OFFICE VISIT (OUTPATIENT)
Dept: ALLERGY | Facility: CLINIC | Age: 34
End: 2024-03-25
Payer: COMMERCIAL

## 2024-03-25 VITALS — OXYGEN SATURATION: 97 % | SYSTOLIC BLOOD PRESSURE: 139 MMHG | HEART RATE: 82 BPM | DIASTOLIC BLOOD PRESSURE: 93 MMHG

## 2024-03-25 DIAGNOSIS — J30.9 ALLERGIC RHINITIS: ICD-10-CM

## 2024-03-25 DIAGNOSIS — J30.89 ALLERGIC RHINITIS DUE TO MOLD: Primary | ICD-10-CM

## 2024-03-25 DIAGNOSIS — J30.1 SEASONAL ALLERGIC RHINITIS DUE TO POLLEN: ICD-10-CM

## 2024-03-25 DIAGNOSIS — J45.20 MILD INTERMITTENT ASTHMA WITHOUT COMPLICATION: ICD-10-CM

## 2024-03-25 PROCEDURE — 95125 IMMUNOTHERAPY 2/> INJECTIONS: CPT

## 2024-03-25 PROCEDURE — 99213 OFFICE O/P EST LOW 20 MIN: CPT | Mod: 25 | Performed by: INTERNAL MEDICINE

## 2024-03-25 RX ORDER — FLUOXETINE 40 MG/1
40 CAPSULE ORAL DAILY
COMMUNITY

## 2024-03-25 RX ORDER — DEXTROAMPHETAMINE SACCHARATE, AMPHETAMINE ASPARTATE, DEXTROAMPHETAMINE SULFATE AND AMPHETAMINE SULFATE 5; 5; 5; 5 MG/1; MG/1; MG/1; MG/1
20 TABLET ORAL 2 TIMES DAILY
COMMUNITY

## 2024-03-25 NOTE — LETTER
3/25/2024         RE: Pebbles Lowry  [address]  [place] [address]        Dear Colleague,    Thank you for referring your patient, Pebbles Lowry, to the [facility] SPECIALTY [facility]. Please see a copy of my visit note below.    Pebbles Lowry was seen in the Allergy Clinic at [facility].    Pebbles Lowry is a 33 year old female being seen today for ongoing evaluation of allergic rhinitis and is receiving allergy immunotherapy.  She is allergic to weeds and mold.    She started allergy shots with Dr. Jimenes November 2021.  She achieved maintenance dosing February 2022.  She has been able to stop Advair and montelukast since starting allergy shots.  She recalls the first spring in 2022 being asymptomatic.  She essentially has no allergy symptoms at this time.  She takes 20 mg of Zyrtec on the day of shot days to help minimize itching and warmth at the site.  She does have mild fatigue the night after allergy shots.  She would rate the success of the allergy shots as 10 out of 10.  She has never had a shot reaction which is systemic.  Overall she is very happy with her complete control of symptoms.    She has an albuterol inhaler and she has not used it for over a year.  She has increased asthma symptoms with upper respiratory infections.  Overall her asthma is very well-controlled.    Since the last visit the patient has been feeling good.     No past medical history on file.  No family history on file.  No past surgical history on file.      Current Outpatient Medications:      albuterol (PROAIR HFA/PROVENTIL HFA/VENTOLIN HFA) 108 (90 Base) MCG/ACT inhaler, Inhale 2 puffs into the lungs every 4 hours as needed, Disp: , Rfl:      amphetamine-dextroamphetamine (ADDERALL) 20 MG tablet, Take 20 mg by mouth 2 times daily, Disp: , Rfl:      cetirizine (ZYRTEC) 10 MG tablet, Take 10 mg by mouth daily, Disp: , Rfl:      Docosahexaenoic Acid (PRENATAL DHA) 200 MG  capsule, Take 200 mg by mouth daily, Disp: , Rfl:      FLUoxetine (PROZAC) 40 MG capsule, Take 40 mg by mouth daily, Disp: , Rfl:      fluticasone (FLONASE) 50 MCG/ACT nasal spray, Spray 1 spray into both nostrils daily, Disp: , Rfl:      olopatadine (PATADAY) 0.2 % ophthalmic solution, 1 drop daily, Disp: , Rfl:      ORDER FOR ALLERGEN IMMUNOTHERAPY, Name of Mix: Mix #1  Mold Alternaria Tenuis 1:10 w/v, HS  0.5 ml Epicoccum Nigrum 1:10 w/v, HS 0.5 ml Diluent: HSA qs to 5ml, Disp: , Rfl:      ORDER FOR ALLERGEN IMMUNOTHERAPY, Name of Mix: Mix #2  Weeds Ragweed Mixed 1:20 w/v ALK  0.5 ml Diluent: HSA qs to 5ml, Disp: , Rfl:      ORDER FOR DME, left elastic knee brace, Disp: 1 Device, Rfl: 0     EPINEPHrine (AUVI-Q) 0.3 MG/0.3ML injection 2-pack, Inject 0.3 mLs (0.3 mg) into the muscle once as needed for anaphylaxis (Patient not taking: Reported on 8/1/2022), Disp: 2 each, Rfl: 2     NABUMETONE 500 MG PO TABS, 1 TABLET TWICE DAILY (Patient not taking: Reported on 3/25/2024), Disp: 60 Tab, Rfl: 0     RECLIPSEN 0.15-30 MG-MCG PO TABS, , Disp: , Rfl:      UNKNOWN MED DOSAGE, med for anxiety prn (Patient not taking: Reported on 3/25/2024), Disp: , Rfl:   No Known Allergies      EXAM:   BP (!) 139/93   Pulse 82   SpO2 97%     Constitutional:       General: She is not in acute distress.     Appearance: Normal appearance. She is not ill-appearing.   HENT:      Head: Normocephalic and atraumatic.      Nose: Nose normal. No congestion or rhinorrhea.      Mouth/Throat:      Mouth: Mucous membranes are moist.      Pharynx: Oropharynx is clear. No posterior oropharyngeal erythema.   Eyes:      General:         Right eye: No discharge.         Left eye: No discharge.   Cardiovascular:      Rate and Rhythm: Normal rate and regular rhythm.      Heart sounds: Normal heart sounds.   Pulmonary:      Effort: Pulmonary effort is normal.      Breath sounds: Normal breath sounds. No wheezing or rhonchi.   Skin:     General: Skin is warm.       Findings: No erythema or rash.   Neurological:      General: No focal deficit present.      Mental Status: She is alert. Mental status is at baseline.   Psychiatric:         Mood and Affect: Mood normal.         Behavior: Behavior normal.        ASSESSMENT/PLAN:  Pebbles Lowry is a 33 year old female seen today for allergic rhinitis as well as asthma follow-up.  Has seen Dr. Jimenes in the past.  Allergy immunotherapy is working extremely well.  She is well aware of the risks and benefits of allergy shots.  She would like to continue with allergy immunotherapy.  She received an allergy shot today.    She will continue allergy immunotherapy ending approximately spring 2026.  Continue Zyrtec on shot days.  Continue albuterol 2 puffs every 4 hours as needed.  Will follow-up 1 year.      Thank you for allowing me to participate in the care of Pebbles Lowry.      I spent 20 minutes on the date of the encounter doing chart review, history and exam, documentation and further coordination as noted above exclusive of separately reported interpretations    Arsen Dunham MD  Allergy/Immunology  [facility]       Again, thank you for allowing me to participate in the care of your patient.        Sincerely,        Arsen Dunham MD

## 2024-03-25 NOTE — PROGRESS NOTES
Pebbles Lowry was seen in the Allergy Clinic at [facility].    Pebbles Lowry is a 33 year old female being seen today for ongoing evaluation of allergic rhinitis and is receiving allergy immunotherapy.  She is allergic to weeds and mold.    She started allergy shots with Dr. Jimenes November 2021.  She achieved maintenance dosing February 2022.  She has been able to stop Advair and montelukast since starting allergy shots.  She recalls the first spring in 2022 being asymptomatic.  She essentially has no allergy symptoms at this time.  She takes 20 mg of Zyrtec on the day of shot days to help minimize itching and warmth at the site.  She does have mild fatigue the night after allergy shots.  She would rate the success of the allergy shots as 10 out of 10.  She has never had a shot reaction which is systemic.  Overall she is very happy with her complete control of symptoms.    She has an albuterol inhaler and she has not used it for over a year.  She has increased asthma symptoms with upper respiratory infections.  Overall her asthma is very well-controlled.    Since the last visit the patient has been feeling good.     No past medical history on file.  No family history on file.  No past surgical history on file.      Current Outpatient Medications:     albuterol (PROAIR HFA/PROVENTIL HFA/VENTOLIN HFA) 108 (90 Base) MCG/ACT inhaler, Inhale 2 puffs into the lungs every 4 hours as needed, Disp: , Rfl:     amphetamine-dextroamphetamine (ADDERALL) 20 MG tablet, Take 20 mg by mouth 2 times daily, Disp: , Rfl:     cetirizine (ZYRTEC) 10 MG tablet, Take 10 mg by mouth daily, Disp: , Rfl:     Docosahexaenoic Acid (PRENATAL DHA) 200 MG capsule, Take 200 mg by mouth daily, Disp: , Rfl:     FLUoxetine (PROZAC) 40 MG capsule, Take 40 mg by mouth daily, Disp: , Rfl:     fluticasone (FLONASE) 50 MCG/ACT nasal spray, Spray 1 spray into both nostrils daily, Disp: , Rfl:     olopatadine (PATADAY) 0.2 %  ophthalmic solution, 1 drop daily, Disp: , Rfl:     ORDER FOR ALLERGEN IMMUNOTHERAPY, Name of Mix: Mix #1  Mold Alternaria Tenuis 1:10 w/v, HS  0.5 ml Epicoccum Nigrum 1:10 w/v, HS 0.5 ml Diluent: HSA qs to 5ml, Disp: , Rfl:     ORDER FOR ALLERGEN IMMUNOTHERAPY, Name of Mix: Mix #2  Weeds Ragweed Mixed 1:20 w/v ALK  0.5 ml Diluent: HSA qs to 5ml, Disp: , Rfl:     ORDER FOR DME, left elastic knee brace, Disp: 1 Device, Rfl: 0    EPINEPHrine (AUVI-Q) 0.3 MG/0.3ML injection 2-pack, Inject 0.3 mLs (0.3 mg) into the muscle once as needed for anaphylaxis (Patient not taking: Reported on 8/1/2022), Disp: 2 each, Rfl: 2    NABUMETONE 500 MG PO TABS, 1 TABLET TWICE DAILY (Patient not taking: Reported on 3/25/2024), Disp: 60 Tab, Rfl: 0    RECLIPSEN 0.15-30 MG-MCG PO TABS, , Disp: , Rfl:     UNKNOWN MED DOSAGE, med for anxiety prn (Patient not taking: Reported on 3/25/2024), Disp: , Rfl:   No Known Allergies      EXAM:   BP (!) 139/93   Pulse 82   SpO2 97%     Constitutional:       General: She is not in acute distress.     Appearance: Normal appearance. She is not ill-appearing.   HENT:      Head: Normocephalic and atraumatic.      Nose: Nose normal. No congestion or rhinorrhea.      Mouth/Throat:      Mouth: Mucous membranes are moist.      Pharynx: Oropharynx is clear. No posterior oropharyngeal erythema.   Eyes:      General:         Right eye: No discharge.         Left eye: No discharge.   Cardiovascular:      Rate and Rhythm: Normal rate and regular rhythm.      Heart sounds: Normal heart sounds.   Pulmonary:      Effort: Pulmonary effort is normal.      Breath sounds: Normal breath sounds. No wheezing or rhonchi.   Skin:     General: Skin is warm.      Findings: No erythema or rash.   Neurological:      General: No focal deficit present.      Mental Status: She is alert. Mental status is at baseline.   Psychiatric:         Mood and Affect: Mood normal.         Behavior: Behavior normal.         ASSESSMENT/PLAN:  Pebbles Lowry is a 33 year old female seen today for allergic rhinitis as well as asthma follow-up.  Has seen Dr. Jimenes in the past.  Allergy immunotherapy is working extremely well.  She is well aware of the risks and benefits of allergy shots.  She would like to continue with allergy immunotherapy.  She received an allergy shot today.    She will continue allergy immunotherapy ending approximately spring 2026.  Continue Zyrtec on shot days.  Continue albuterol 2 puffs every 4 hours as needed.  Will follow-up 1 year.      Thank you for allowing me to participate in the care of Pebbles Lowry.      I spent 20 minutes on the date of the encounter doing chart review, history and exam, documentation and further coordination as noted above exclusive of separately reported interpretations    Arsen Dunham MD  Allergy/Immunology  [facility]

## 2024-05-08 ENCOUNTER — MYC MEDICAL ADVICE (OUTPATIENT)
Dept: ALLERGY | Facility: CLINIC | Age: 34
End: 2024-05-08
Payer: COMMERCIAL

## 2024-05-08 DIAGNOSIS — J45.20 MILD INTERMITTENT ASTHMA WITHOUT COMPLICATION: Primary | ICD-10-CM

## 2024-05-08 NOTE — TELEPHONE ENCOUNTER
Pt sent [employer] message reporting asthma flare.   Attempted to call pt - no answer, left vm.   Sent [employer] message back to pt to find out more about her symptoms.   Shandra Workman RN on 5/8/2024 at 4:06 PM

## 2024-05-09 RX ORDER — BUDESONIDE AND FORMOTEROL FUMARATE DIHYDRATE 160; 4.5 UG/1; UG/1
2 AEROSOL RESPIRATORY (INHALATION) 2 TIMES DAILY
Qty: 10.2 G | Refills: 3 | Status: SHIPPED | OUTPATIENT
Start: 2024-05-09 | End: 2024-09-04

## 2024-05-10 ENCOUNTER — OFFICE VISIT (OUTPATIENT)
Dept: ALLERGY | Facility: CLINIC | Age: 34
End: 2024-05-10
Payer: COMMERCIAL

## 2024-05-10 VITALS
HEART RATE: 104 BPM | RESPIRATION RATE: 22 BRPM | SYSTOLIC BLOOD PRESSURE: 132 MMHG | DIASTOLIC BLOOD PRESSURE: 92 MMHG | OXYGEN SATURATION: 98 % | WEIGHT: 240 LBS

## 2024-05-10 DIAGNOSIS — J40 BRONCHITIS: ICD-10-CM

## 2024-05-10 DIAGNOSIS — J30.1 SEASONAL ALLERGIC RHINITIS DUE TO POLLEN: ICD-10-CM

## 2024-05-10 DIAGNOSIS — J30.89 ALLERGIC RHINITIS DUE TO MOLD: ICD-10-CM

## 2024-05-10 DIAGNOSIS — J45.21 MILD INTERMITTENT ASTHMA WITH EXACERBATION: Primary | ICD-10-CM

## 2024-05-10 PROCEDURE — 99214 OFFICE O/P EST MOD 30 MIN: CPT | Performed by: INTERNAL MEDICINE

## 2024-05-10 RX ORDER — AZITHROMYCIN 250 MG/1
TABLET, FILM COATED ORAL
Qty: 6 TABLET | Refills: 0 | Status: SHIPPED | OUTPATIENT
Start: 2024-05-10 | End: 2024-05-15

## 2024-05-10 RX ORDER — PREDNISONE 20 MG/1
TABLET ORAL
Qty: 15 TABLET | Refills: 0 | Status: SHIPPED | OUTPATIENT
Start: 2024-05-10

## 2024-05-10 NOTE — LETTER
5/10/2024         RE: Pebbles Lowry  [address]  [place] [address]        Dear Colleague,    Thank you for referring your patient, Pebbles Lowry, to the [facility] SPECIALTY [facility]. Please see a copy of my visit note below.    Pebbles Lowry was seen in the Allergy Clinic at [facility].    Pebbles Lowry is a 33 year old female being seen today for ongoing evaluation of cough.     Since the last visit the patient has had recent significant problems.  His last seen March 25 and was doing well at that time.    She has known allergic rhinitis and is receiving allergy shots which have been working great.  Her asthma has been extremely stable in the past.  She has not required Advair albuterol for over a year at the last appointment.  However she developed an upper respiratory infection on April 11 and was seen and had a throat swab that was negative.  She was seen April 19 virtually and April 20 also.  She was started on prednisone 60 mg tapering down to 40 then 20 mg 3 days of each.  It did provide some benefit.  Symptoms momentarily improved in regards to her cough.      Her ear pressure also improved on the prednisone.      A few days later the cough returned and over the last week or 10 days she has had significant cough and has difficulty sleeping.  Only sleeping a few hours per night.  She is sweating significantly and feels warm but does not have a fever.  Symbicort was prescribed yesterday.  She is coughing and has postnasal drainage.    PMH:    Asthma and allergic rhinitis    No past medical history on file.  No family history on file.  No past surgical history on file.      Current Outpatient Medications:      azithromycin (ZITHROMAX) 250 MG tablet, Take 2 tablets (500 mg) by mouth daily for 1 day, THEN 1 tablet (250 mg) daily for 4 days., Disp: 6 tablet, Rfl: 0     predniSONE (DELTASONE) 20 MG tablet, Take 2 tabs daily x 5 days, 1 tab daily x 5  days., Disp: 15 tablet, Rfl: 0     albuterol (PROAIR HFA/PROVENTIL HFA/VENTOLIN HFA) 108 (90 Base) MCG/ACT inhaler, Inhale 2 puffs into the lungs every 4 hours as needed, Disp: , Rfl:      amphetamine-dextroamphetamine (ADDERALL) 20 MG tablet, Take 20 mg by mouth 2 times daily, Disp: , Rfl:      budesonide-formoterol (SYMBICORT) 160-4.5 MCG/ACT Inhaler, Inhale 2 puffs into the lungs 2 times daily, Disp: 10.2 g, Rfl: 3     cetirizine (ZYRTEC) 10 MG tablet, Take 10 mg by mouth daily, Disp: , Rfl:      Docosahexaenoic Acid (PRENATAL DHA) 200 MG capsule, Take 200 mg by mouth daily, Disp: , Rfl:      EPINEPHrine (AUVI-Q) 0.3 MG/0.3ML injection 2-pack, Inject 0.3 mLs (0.3 mg) into the muscle once as needed for anaphylaxis (Patient not taking: Reported on 8/1/2022), Disp: 2 each, Rfl: 2     FLUoxetine (PROZAC) 40 MG capsule, Take 40 mg by mouth daily, Disp: , Rfl:      fluticasone (FLONASE) 50 MCG/ACT nasal spray, Spray 1 spray into both nostrils daily, Disp: , Rfl:      NABUMETONE 500 MG PO TABS, 1 TABLET TWICE DAILY (Patient not taking: Reported on 3/25/2024), Disp: 60 Tab, Rfl: 0     olopatadine (PATADAY) 0.2 % ophthalmic solution, 1 drop daily, Disp: , Rfl:      ORDER FOR ALLERGEN IMMUNOTHERAPY, Name of Mix: Mix #1  Mold Alternaria Tenuis 1:10 w/v, HS  0.5 ml Epicoccum Nigrum 1:10 w/v, HS 0.5 ml Diluent: HSA qs to 5ml, Disp: , Rfl:      ORDER FOR ALLERGEN IMMUNOTHERAPY, Name of Mix: Mix #2  Weeds Ragweed Mixed 1:20 w/v ALK  0.5 ml Diluent: HSA qs to 5ml, Disp: , Rfl:      ORDER FOR DME, left elastic knee brace, Disp: 1 Device, Rfl: 0     RECLIPSEN 0.15-30 MG-MCG PO TABS, , Disp: , Rfl:      UNKNOWN MED DOSAGE, med for anxiety prn (Patient not taking: Reported on 3/25/2024), Disp: , Rfl:   No Known Allergies      EXAM:   BP (!) 132/92 (BP Location: Left arm, Patient Position: Sitting, Cuff Size: Adult Large)   Pulse 104   Resp 22   Wt 108.9 kg (240 lb)   SpO2 98%     Constitutional:       General: She is not in  acute distress.     Appearance: Normal appearance. She is not ill-appearing.   HENT:      Head: Normocephalic and atraumatic.      Nose: Mucosa is erythematous.     Mouth/Throat:      Mouth: Mucous membranes are moist.      Pharynx: Oropharynx is clear. No posterior oropharyngeal erythema.   Eyes:      General:         Right eye: No discharge.         Left eye: No discharge.   Cardiovascular:      Rate and Rhythm: Normal rate and regular rhythm.      Heart sounds: Normal heart sounds.   Pulmonary:      Effort: Pulmonary effort is normal.      Breath sounds: Normal breath sounds. No wheezing or rhonchi.   Skin:     General: Skin is warm.      Findings: No erythema or rash.   Neurological:      General: No focal deficit present.      Mental Status: She is alert. Mental status is at baseline.   Psychiatric:         Mood and Affect: Mood normal.         Behavior: Behavior normal.      ASSESSMENT/PLAN:  Pebbles Lowry is a 33 year old female with approximately a month of illness.  Starting with a sore throat and viral infection for which prednisone provided some benefit for the ear pressure and also cough likely secondary to an asthma exacerbation.  Symptoms returned approximately a week ago.  She is now having significant postnasal drainage and coughing throughout the night.  Will prescribe antibiotics and steroids.    Prednisone prescribed as well as azithromycin.  She will contact the clinic early next week with an update on her symptoms.  Symbicort will continue 2 puffs twice daily.  May use additional puffs of Symbicort if necessary.    Follow-up as previously recommended    Thank you for allowing me to participate in the care of Pebbles Lowry.      I spent 30 minutes on the date of the encounter doing chart review, history and exam, documentation and further coordination as noted above exclusive of separately reported interpretations    Arsen Dunham MD  Allergy/Immunology  [facility]        Again, thank you for allowing me to participate in the care of your patient.        Sincerely,        Arsen Dunham MD

## 2024-05-10 NOTE — PROGRESS NOTES
Pebbles Lowry was seen in the Allergy Clinic at [facility].    Pebbles Lowry is a 33 year old female being seen today for ongoing evaluation of cough.     Since the last visit the patient has had recent significant problems.  His last seen March 25 and was doing well at that time.    She has known allergic rhinitis and is receiving allergy shots which have been working great.  Her asthma has been extremely stable in the past.  She has not required Advair albuterol for over a year at the last appointment.  However she developed an upper respiratory infection on April 11 and was seen and had a throat swab that was negative.  She was seen April 19 virtually and April 20 also.  She was started on prednisone 60 mg tapering down to 40 then 20 mg 3 days of each.  It did provide some benefit.  Symptoms momentarily improved in regards to her cough.      Her ear pressure also improved on the prednisone.      A few days later the cough returned and over the last week or 10 days she has had significant cough and has difficulty sleeping.  Only sleeping a few hours per night.  She is sweating significantly and feels warm but does not have a fever.  Symbicort was prescribed yesterday.  She is coughing and has postnasal drainage.    PMH:    Asthma and allergic rhinitis    No past medical history on file.  No family history on file.  No past surgical history on file.      Current Outpatient Medications:     azithromycin (ZITHROMAX) 250 MG tablet, Take 2 tablets (500 mg) by mouth daily for 1 day, THEN 1 tablet (250 mg) daily for 4 days., Disp: 6 tablet, Rfl: 0    predniSONE (DELTASONE) 20 MG tablet, Take 2 tabs daily x 5 days, 1 tab daily x 5 days., Disp: 15 tablet, Rfl: 0    albuterol (PROAIR HFA/PROVENTIL HFA/VENTOLIN HFA) 108 (90 Base) MCG/ACT inhaler, Inhale 2 puffs into the lungs every 4 hours as needed, Disp: , Rfl:     amphetamine-dextroamphetamine (ADDERALL) 20 MG tablet, Take 20 mg by mouth 2 times  daily, Disp: , Rfl:     budesonide-formoterol (SYMBICORT) 160-4.5 MCG/ACT Inhaler, Inhale 2 puffs into the lungs 2 times daily, Disp: 10.2 g, Rfl: 3    cetirizine (ZYRTEC) 10 MG tablet, Take 10 mg by mouth daily, Disp: , Rfl:     Docosahexaenoic Acid (PRENATAL DHA) 200 MG capsule, Take 200 mg by mouth daily, Disp: , Rfl:     EPINEPHrine (AUVI-Q) 0.3 MG/0.3ML injection 2-pack, Inject 0.3 mLs (0.3 mg) into the muscle once as needed for anaphylaxis (Patient not taking: Reported on 8/1/2022), Disp: 2 each, Rfl: 2    FLUoxetine (PROZAC) 40 MG capsule, Take 40 mg by mouth daily, Disp: , Rfl:     fluticasone (FLONASE) 50 MCG/ACT nasal spray, Spray 1 spray into both nostrils daily, Disp: , Rfl:     NABUMETONE 500 MG PO TABS, 1 TABLET TWICE DAILY (Patient not taking: Reported on 3/25/2024), Disp: 60 Tab, Rfl: 0    olopatadine (PATADAY) 0.2 % ophthalmic solution, 1 drop daily, Disp: , Rfl:     ORDER FOR ALLERGEN IMMUNOTHERAPY, Name of Mix: Mix #1  Mold Alternaria Tenuis 1:10 w/v, HS  0.5 ml Epicoccum Nigrum 1:10 w/v, HS 0.5 ml Diluent: HSA qs to 5ml, Disp: , Rfl:     ORDER FOR ALLERGEN IMMUNOTHERAPY, Name of Mix: Mix #2  Weeds Ragweed Mixed 1:20 w/v ALK  0.5 ml Diluent: HSA qs to 5ml, Disp: , Rfl:     ORDER FOR DME, left elastic knee brace, Disp: 1 Device, Rfl: 0    RECLIPSEN 0.15-30 MG-MCG PO TABS, , Disp: , Rfl:     UNKNOWN MED DOSAGE, med for anxiety prn (Patient not taking: Reported on 3/25/2024), Disp: , Rfl:   No Known Allergies      EXAM:   BP (!) 132/92 (BP Location: Left arm, Patient Position: Sitting, Cuff Size: Adult Large)   Pulse 104   Resp 22   Wt 108.9 kg (240 lb)   SpO2 98%     Constitutional:       General: She is not in acute distress.     Appearance: Normal appearance. She is not ill-appearing.   HENT:      Head: Normocephalic and atraumatic.      Nose: Mucosa is erythematous.     Mouth/Throat:      Mouth: Mucous membranes are moist.      Pharynx: Oropharynx is clear. No posterior oropharyngeal  erythema.   Eyes:      General:         Right eye: No discharge.         Left eye: No discharge.   Cardiovascular:      Rate and Rhythm: Normal rate and regular rhythm.      Heart sounds: Normal heart sounds.   Pulmonary:      Effort: Pulmonary effort is normal.      Breath sounds: Normal breath sounds. No wheezing or rhonchi.   Skin:     General: Skin is warm.      Findings: No erythema or rash.   Neurological:      General: No focal deficit present.      Mental Status: She is alert. Mental status is at baseline.   Psychiatric:         Mood and Affect: Mood normal.         Behavior: Behavior normal.      ASSESSMENT/PLAN:  Pebbles Lowry is a 33 year old female with approximately a month of illness.  Starting with a sore throat and viral infection for which prednisone provided some benefit for the ear pressure and also cough likely secondary to an asthma exacerbation.  Symptoms returned approximately a week ago.  She is now having significant postnasal drainage and coughing throughout the night.  Will prescribe antibiotics and steroids.    Prednisone prescribed as well as azithromycin.  She will contact the clinic early next week with an update on her symptoms.  Symbicort will continue 2 puffs twice daily.  May use additional puffs of Symbicort if necessary.    Follow-up as previously recommended    Thank you for allowing me to participate in the care of Pebbles Lowry.      I spent 30 minutes on the date of the encounter doing chart review, history and exam, documentation and further coordination as noted above exclusive of separately reported interpretations    Arsen Dunham MD  Allergy/Immunology  [facility]

## 2024-05-10 NOTE — PATIENT INSTRUCTIONS
Allergy Staff Appt Hours Shot Hours Location       Physician   Arsen Dunham MD      Support Staff   MINAL Benjamin RN, MA Emily J., MA      Mondays Tuesdays Thursdays and Fridays:      Tita 7-5      Wednesdays         Close                Mondays, Tuesdays and Fridays:  7:20 - 3:40              [facility]  [address]  [place] [address]  Allergy appointment  line: [phone]    Pulmonary Function Scheduling:  [place]: [phone]           Questions about cost of your care  For questions about your cost of your visit, procedure, lab or imaging contact: [facility] [phone] or visit:  [email or web address]    Prescription Assistance  If you need assistance with your prescriptions (cost, coverage, etc) please contact: [facility] [phone]    Important Scheduling Information  All visits for food challenges, medication/drug allergy testing, and drug challenges MUST be scheduled through the allergy clinic nurse. Please contact them via [facility] [place] or by calling the clinic at [phone] and asking to speak with an allergy nurse. They will provide additional information and instructions for the appointment. Discontinue oral antihistamines 7 days prior to the appointment. Discontinue nasal and ocular antihistamines 1 day prior to the appointment.    Appointments for skin testing: Appointment will last approximately 45 minutes.  Please call the appointment line for your clinic to schedule.  Discontinue oral antihistamines 7 days prior to the appointment.  Discontinue nasal and ocular antihistamines 1 days prior to appointment.    Thank you for trusting us with your care. Please feel free to contact us with any questions or concerns you may have.

## 2024-05-16 ENCOUNTER — TELEPHONE (OUTPATIENT)
Dept: ALLERGY | Facility: CLINIC | Age: 34
End: 2024-05-16
Payer: COMMERCIAL

## 2024-05-16 NOTE — TELEPHONE ENCOUNTER
Erroneous encounter - disregard this telephone encounter. Dr. Dunham was able to answer questions.     Arline Selby, BSN, RN

## 2024-05-17 ENCOUNTER — ALLIED HEALTH/NURSE VISIT (OUTPATIENT)
Dept: ALLERGY | Facility: CLINIC | Age: 34
End: 2024-05-17
Payer: COMMERCIAL

## 2024-05-17 DIAGNOSIS — J30.89 ALLERGIC RHINITIS DUE TO MOLD: Primary | ICD-10-CM

## 2024-05-17 DIAGNOSIS — J30.9 ALLERGIC RHINITIS: ICD-10-CM

## 2024-05-17 DIAGNOSIS — J30.1 SEASONAL ALLERGIC RHINITIS DUE TO POLLEN: ICD-10-CM

## 2024-05-17 PROCEDURE — 95125 IMMUNOTHERAPY 2/> INJECTIONS: CPT

## 2024-05-20 ENCOUNTER — ALLIED HEALTH/NURSE VISIT (OUTPATIENT)
Dept: ALLERGY | Facility: CLINIC | Age: 34
End: 2024-05-20
Payer: COMMERCIAL

## 2024-05-20 DIAGNOSIS — J30.89 ALLERGIC RHINITIS DUE TO MOLD: Primary | ICD-10-CM

## 2024-05-20 DIAGNOSIS — J30.1 SEASONAL ALLERGIC RHINITIS DUE TO POLLEN: ICD-10-CM

## 2024-05-20 DIAGNOSIS — J30.9 ALLERGIC RHINITIS: ICD-10-CM

## 2024-05-20 PROCEDURE — 95125 IMMUNOTHERAPY 2/> INJECTIONS: CPT

## 2024-05-31 ENCOUNTER — ALLIED HEALTH/NURSE VISIT (OUTPATIENT)
Dept: ALLERGY | Facility: CLINIC | Age: 34
End: 2024-05-31
Payer: COMMERCIAL

## 2024-05-31 DIAGNOSIS — J30.89 ALLERGIC RHINITIS DUE TO MOLD: Primary | ICD-10-CM

## 2024-05-31 DIAGNOSIS — J30.1 SEASONAL ALLERGIC RHINITIS DUE TO POLLEN: ICD-10-CM

## 2024-05-31 DIAGNOSIS — J30.9 ALLERGIC RHINITIS: ICD-10-CM

## 2024-05-31 PROCEDURE — 95125 IMMUNOTHERAPY 2/> INJECTIONS: CPT

## 2024-06-23 ENCOUNTER — HEALTH MAINTENANCE LETTER (OUTPATIENT)
Age: 34
End: 2024-06-23

## 2024-06-28 ENCOUNTER — ALLIED HEALTH/NURSE VISIT (OUTPATIENT)
Dept: ALLERGY | Facility: CLINIC | Age: 34
End: 2024-06-28
Payer: COMMERCIAL

## 2024-06-28 DIAGNOSIS — J30.89 ALLERGIC RHINITIS DUE TO MOLD: Primary | ICD-10-CM

## 2024-06-28 DIAGNOSIS — J30.1 SEASONAL ALLERGIC RHINITIS DUE TO POLLEN: ICD-10-CM

## 2024-06-28 PROCEDURE — 95125 IMMUNOTHERAPY 2/> INJECTIONS: CPT

## 2024-08-02 ENCOUNTER — ALLIED HEALTH/NURSE VISIT (OUTPATIENT)
Dept: ALLERGY | Facility: CLINIC | Age: 34
End: 2024-08-02
Payer: COMMERCIAL

## 2024-08-02 DIAGNOSIS — J30.1 SEASONAL ALLERGIC RHINITIS DUE TO POLLEN: ICD-10-CM

## 2024-08-02 DIAGNOSIS — J30.89 ALLERGIC RHINITIS DUE TO MOLD: Primary | ICD-10-CM

## 2024-08-02 PROCEDURE — 95125 IMMUNOTHERAPY 2/> INJECTIONS: CPT

## 2024-08-02 NOTE — PROGRESS NOTES
Pebbles Lowry presents to clinic today at the request of Hemalatha Jimenes MD (ordering provider) for Allergy Immunotherapy injection(s).       This service provided today was under the care of Arsen Dunham MD; the supervising provider of the day; who was available if needed.      Patient presented after waiting 30 minutes with no reaction to  injections. Discharged from clinic.    Gunjan See RN

## 2024-09-04 DIAGNOSIS — J45.20 MILD INTERMITTENT ASTHMA WITHOUT COMPLICATION: ICD-10-CM

## 2024-09-04 RX ORDER — BUDESONIDE AND FORMOTEROL FUMARATE DIHYDRATE 160; 4.5 UG/1; UG/1
2 AEROSOL RESPIRATORY (INHALATION) 2 TIMES DAILY
Qty: 10.2 G | Refills: 3 | Status: SHIPPED | OUTPATIENT
Start: 2024-09-04

## 2024-09-04 NOTE — TELEPHONE ENCOUNTER
Requested Prescriptions   Pending Prescriptions Disp Refills    budesonide-formoterol (SYMBICORT) 160-4.5 MCG/ACT Inhaler 10.2 g 3     Sig: Inhale 2 puffs into the lungs 2 times daily.       There is no refill protocol information for this order            Last Written Prescription Date:  5/9/2024  Last Fill Quantity: 10.2 g,  # refills: 3   Last office visit: 5/10/2024 ; last virtual visit: Visit date not found with prescribing provider:  Arsen Dunham MD    Future Office Visit: 3/25/2025

## 2024-09-04 NOTE — TELEPHONE ENCOUNTER
Requested Prescriptions   Pending Prescriptions Disp Refills    budesonide-formoterol (SYMBICORT) 160-4.5 MCG/ACT Inhaler 10.2 g 3     Sig: Inhale 2 puffs into the lungs 2 times daily.       Inhaled Steroids Protocol Failed - 9/4/2024 10:09 AM        Failed - Asthma control assessment score within normal limits in last 6 months     Please review ACT score.           Passed - Patient is age 12 or older        Passed - Medication is active on med list        Passed - Recent (6 mo) or future (90 days) visit within the authorizing provider's specialty     The patient must have completed an in-person or virtual visit within the past 6 months or has a future visit scheduled within the next 90 days with the authorizing provider s specialty.  Urgent care and e-visits do not quality as an office visit for this protocol.          Passed - Medication indicated for associated diagnosis     Medication is associated with one or more of the following diagnoses:    Allergies   Asthma   COPD   Nasal Congestion   Nasal Polyps   Rhinitis             Patient has not had a recent ACT, last collected was in 2022. Patient has been using Symbicort 2 puffs twice daily. Will refill Rx and send patient a message to complete an ACT.     Arline Selby, PEDRITON, RN

## 2024-09-06 ENCOUNTER — ALLIED HEALTH/NURSE VISIT (OUTPATIENT)
Dept: ALLERGY | Facility: CLINIC | Age: 34
End: 2024-09-06
Payer: COMMERCIAL

## 2024-09-06 DIAGNOSIS — J30.1 SEASONAL ALLERGIC RHINITIS DUE TO POLLEN: ICD-10-CM

## 2024-09-06 DIAGNOSIS — J30.89 ALLERGIC RHINITIS DUE TO MOLD: Primary | ICD-10-CM

## 2024-09-06 PROCEDURE — 95125 IMMUNOTHERAPY 2/> INJECTIONS: CPT

## 2024-09-06 ASSESSMENT — ASTHMA QUESTIONNAIRES
QUESTION_2 LAST FOUR WEEKS HOW OFTEN HAVE YOU HAD SHORTNESS OF BREATH: NOT AT ALL
ACT_TOTALSCORE: 25
QUESTION_1 LAST FOUR WEEKS HOW MUCH OF THE TIME DID YOUR ASTHMA KEEP YOU FROM GETTING AS MUCH DONE AT WORK, SCHOOL OR AT HOME: NONE OF THE TIME
ACT_TOTALSCORE: 25
QUESTION_4 LAST FOUR WEEKS HOW OFTEN HAVE YOU USED YOUR RESCUE INHALER OR NEBULIZER MEDICATION (SUCH AS ALBUTEROL): NOT AT ALL
QUESTION_3 LAST FOUR WEEKS HOW OFTEN DID YOUR ASTHMA SYMPTOMS (WHEEZING, COUGHING, SHORTNESS OF BREATH, CHEST TIGHTNESS OR PAIN) WAKE YOU UP AT NIGHT OR EARLIER THAN USUAL IN THE MORNING: NOT AT ALL
QUESTION_5 LAST FOUR WEEKS HOW WOULD YOU RATE YOUR ASTHMA CONTROL: COMPLETELY CONTROLLED

## 2024-10-14 ENCOUNTER — ALLIED HEALTH/NURSE VISIT (OUTPATIENT)
Dept: ALLERGY | Facility: CLINIC | Age: 34
End: 2024-10-14

## 2024-10-14 DIAGNOSIS — J30.1 SEASONAL ALLERGIC RHINITIS DUE TO POLLEN: ICD-10-CM

## 2024-10-14 DIAGNOSIS — J30.89 ALLERGIC RHINITIS DUE TO MOLD: Primary | ICD-10-CM

## 2024-10-14 PROCEDURE — 95125 IMMUNOTHERAPY 2/> INJECTIONS: CPT

## 2024-10-21 ENCOUNTER — ALLIED HEALTH/NURSE VISIT (OUTPATIENT)
Dept: ALLERGY | Facility: CLINIC | Age: 34
End: 2024-10-21

## 2024-10-21 DIAGNOSIS — J30.1 SEASONAL ALLERGIC RHINITIS DUE TO POLLEN: ICD-10-CM

## 2024-10-21 DIAGNOSIS — J30.89 ALLERGIC RHINITIS DUE TO MOLD: Primary | ICD-10-CM

## 2024-10-21 PROCEDURE — 95125 IMMUNOTHERAPY 2/> INJECTIONS: CPT

## 2024-11-18 ENCOUNTER — ALLIED HEALTH/NURSE VISIT (OUTPATIENT)
Dept: ALLERGY | Facility: CLINIC | Age: 34
End: 2024-11-18
Payer: COMMERCIAL

## 2024-11-18 DIAGNOSIS — J30.89 ALLERGIC RHINITIS DUE TO MOLD: Primary | ICD-10-CM

## 2024-11-18 DIAGNOSIS — J30.89 ALLERGIC RHINITIS DUE TO MOLD: ICD-10-CM

## 2024-11-18 DIAGNOSIS — J30.1 SEASONAL ALLERGIC RHINITIS DUE TO POLLEN: ICD-10-CM

## 2024-11-18 NOTE — TELEPHONE ENCOUNTER
ALLERGY SOLUTION RE-ORDER REQUEST    Pebbles Lowry 1990 MRN: [id number]    DATE NEEDED:  12/11/2025  Vial Color Content    Vial Size  Red 1:1 Weeds     5 mL  Red 1:1 Molds    5 mL      Serum reorder consent signed and patient/parent was advised that new serums would be ordered through the pharmacy and billed to their insurance company when they arrive in clinic. Yes    Shot Clinic Location:  [facility].  Ship to Location: [facility].  Serum billed to:  [facility]    Special Instructions:  N/A        Requester Signature  Gunjan See RN